# Patient Record
Sex: FEMALE | Race: WHITE | NOT HISPANIC OR LATINO
[De-identification: names, ages, dates, MRNs, and addresses within clinical notes are randomized per-mention and may not be internally consistent; named-entity substitution may affect disease eponyms.]

---

## 2019-01-22 PROBLEM — Z00.00 ENCOUNTER FOR PREVENTIVE HEALTH EXAMINATION: Status: ACTIVE | Noted: 2019-01-22

## 2019-02-21 ENCOUNTER — APPOINTMENT (OUTPATIENT)
Dept: ANTEPARTUM | Facility: CLINIC | Age: 24
End: 2019-02-21
Payer: MEDICAID

## 2019-02-21 ENCOUNTER — ASOB RESULT (OUTPATIENT)
Age: 24
End: 2019-02-21

## 2019-02-21 PROCEDURE — 76817 TRANSVAGINAL US OBSTETRIC: CPT | Mod: 59

## 2019-02-21 PROCEDURE — 76811 OB US DETAILED SNGL FETUS: CPT

## 2019-06-28 ENCOUNTER — INPATIENT (INPATIENT)
Facility: HOSPITAL | Age: 24
LOS: 1 days | Discharge: HOME | End: 2019-06-30
Attending: SPECIALIST | Admitting: SPECIALIST

## 2019-06-28 ENCOUNTER — TRANSCRIPTION ENCOUNTER (OUTPATIENT)
Age: 24
End: 2019-06-28

## 2019-06-28 VITALS
HEART RATE: 81 BPM | DIASTOLIC BLOOD PRESSURE: 71 MMHG | RESPIRATION RATE: 14 BRPM | OXYGEN SATURATION: 100 % | TEMPERATURE: 99 F | SYSTOLIC BLOOD PRESSURE: 116 MMHG

## 2019-06-28 DIAGNOSIS — O26.899 OTHER SPECIFIED PREGNANCY RELATED CONDITIONS, UNSPECIFIED TRIMESTER: ICD-10-CM

## 2019-06-28 DIAGNOSIS — O42.90 PREMATURE RUPTURE OF MEMBRANES, UNSPECIFIED AS TO LENGTH OF TIME BETWEEN RUPTURE AND ONSET OF LABOR, UNSPECIFIED WEEKS OF GESTATION: ICD-10-CM

## 2019-06-28 DIAGNOSIS — Z3A.00 WEEKS OF GESTATION OF PREGNANCY NOT SPECIFIED: ICD-10-CM

## 2019-06-28 LAB
BASOPHILS # BLD AUTO: 0.01 K/UL — SIGNIFICANT CHANGE UP (ref 0–0.2)
BASOPHILS NFR BLD AUTO: 0.1 % — SIGNIFICANT CHANGE UP (ref 0–2)
BLD GP AB SCN SERPL QL: NEGATIVE — SIGNIFICANT CHANGE UP
EOSINOPHIL # BLD AUTO: 0.02 K/UL — SIGNIFICANT CHANGE UP (ref 0–0.5)
EOSINOPHIL NFR BLD AUTO: 0.2 % — SIGNIFICANT CHANGE UP (ref 0–6)
HBV SURFACE AG SER-ACNC: NEGATIVE — SIGNIFICANT CHANGE UP
HCT VFR BLD CALC: 38.1 % — SIGNIFICANT CHANGE UP (ref 34.5–45)
HGB BLD-MCNC: 12.6 G/DL — SIGNIFICANT CHANGE UP (ref 11.5–15.5)
HIV COMBO RESULT: SIGNIFICANT CHANGE UP
HIV1+2 AB SPEC QL: SIGNIFICANT CHANGE UP
IMM GRANULOCYTES NFR BLD AUTO: 1.1 % — SIGNIFICANT CHANGE UP (ref 0–1.5)
LYMPHOCYTES # BLD AUTO: 1.87 K/UL — SIGNIFICANT CHANGE UP (ref 1–3.3)
LYMPHOCYTES # BLD AUTO: 20 % — SIGNIFICANT CHANGE UP (ref 13–44)
MCHC RBC-ENTMCNC: 27.7 PG — SIGNIFICANT CHANGE UP (ref 27–34)
MCHC RBC-ENTMCNC: 33.1 % — SIGNIFICANT CHANGE UP (ref 32–36)
MCV RBC AUTO: 83.7 FL — SIGNIFICANT CHANGE UP (ref 80–100)
MONOCYTES # BLD AUTO: 0.75 K/UL — SIGNIFICANT CHANGE UP (ref 0–0.9)
MONOCYTES NFR BLD AUTO: 8 % — SIGNIFICANT CHANGE UP (ref 2–14)
NEUTROPHILS # BLD AUTO: 6.59 K/UL — SIGNIFICANT CHANGE UP (ref 1.8–7.4)
NEUTROPHILS NFR BLD AUTO: 70.6 % — SIGNIFICANT CHANGE UP (ref 43–77)
NRBC # FLD: 0 K/UL — SIGNIFICANT CHANGE UP (ref 0–0)
PLATELET # BLD AUTO: 208 K/UL — SIGNIFICANT CHANGE UP (ref 150–400)
PMV BLD: 12.2 FL — SIGNIFICANT CHANGE UP (ref 7–13)
RBC # BLD: 4.55 M/UL — SIGNIFICANT CHANGE UP (ref 3.8–5.2)
RBC # FLD: 13.7 % — SIGNIFICANT CHANGE UP (ref 10.3–14.5)
RH IG SCN BLD-IMP: POSITIVE — SIGNIFICANT CHANGE UP
RH IG SCN BLD-IMP: POSITIVE — SIGNIFICANT CHANGE UP
T PALLIDUM AB TITR SER: NEGATIVE — SIGNIFICANT CHANGE UP
WBC # BLD: 9.34 K/UL — SIGNIFICANT CHANGE UP (ref 3.8–10.5)
WBC # FLD AUTO: 9.34 K/UL — SIGNIFICANT CHANGE UP (ref 3.8–10.5)

## 2019-06-28 RX ORDER — CITRIC ACID/SODIUM CITRATE 300-500 MG
15 SOLUTION, ORAL ORAL EVERY 6 HOURS
Refills: 0 | Status: DISCONTINUED | OUTPATIENT
Start: 2019-06-28 | End: 2019-06-28

## 2019-06-28 RX ORDER — GLYCERIN ADULT
1 SUPPOSITORY, RECTAL RECTAL AT BEDTIME
Refills: 0 | Status: DISCONTINUED | OUTPATIENT
Start: 2019-06-28 | End: 2019-06-30

## 2019-06-28 RX ORDER — SIMETHICONE 80 MG/1
80 TABLET, CHEWABLE ORAL EVERY 4 HOURS
Refills: 0 | Status: DISCONTINUED | OUTPATIENT
Start: 2019-06-28 | End: 2019-06-30

## 2019-06-28 RX ORDER — ACETAMINOPHEN 500 MG
975 TABLET ORAL
Refills: 0 | Status: DISCONTINUED | OUTPATIENT
Start: 2019-06-28 | End: 2019-06-30

## 2019-06-28 RX ORDER — IBUPROFEN 200 MG
600 TABLET ORAL EVERY 6 HOURS
Refills: 0 | Status: DISCONTINUED | OUTPATIENT
Start: 2019-06-28 | End: 2019-06-30

## 2019-06-28 RX ORDER — DOCUSATE SODIUM 100 MG
100 CAPSULE ORAL
Refills: 0 | Status: DISCONTINUED | OUTPATIENT
Start: 2019-06-28 | End: 2019-06-30

## 2019-06-28 RX ORDER — HYDROCORTISONE 1 %
1 OINTMENT (GRAM) TOPICAL EVERY 6 HOURS
Refills: 0 | Status: DISCONTINUED | OUTPATIENT
Start: 2019-06-28 | End: 2019-06-30

## 2019-06-28 RX ORDER — PRAMOXINE HYDROCHLORIDE 150 MG/15G
1 AEROSOL, FOAM RECTAL EVERY 4 HOURS
Refills: 0 | Status: DISCONTINUED | OUTPATIENT
Start: 2019-06-28 | End: 2019-06-30

## 2019-06-28 RX ORDER — OXYCODONE HYDROCHLORIDE 5 MG/1
5 TABLET ORAL ONCE
Refills: 0 | Status: DISCONTINUED | OUTPATIENT
Start: 2019-06-28 | End: 2019-06-30

## 2019-06-28 RX ORDER — OXYTOCIN 10 UNIT/ML
333.33 VIAL (ML) INJECTION
Qty: 20 | Refills: 0 | Status: DISCONTINUED | OUTPATIENT
Start: 2019-06-28 | End: 2019-06-28

## 2019-06-28 RX ORDER — FOLIC ACID 0.8 MG
0 TABLET ORAL
Qty: 0 | Refills: 0 | DISCHARGE

## 2019-06-28 RX ORDER — SODIUM CHLORIDE 9 MG/ML
3 INJECTION INTRAMUSCULAR; INTRAVENOUS; SUBCUTANEOUS EVERY 8 HOURS
Refills: 0 | Status: DISCONTINUED | OUTPATIENT
Start: 2019-06-28 | End: 2019-06-30

## 2019-06-28 RX ORDER — AMPICILLIN TRIHYDRATE 250 MG
1 CAPSULE ORAL EVERY 4 HOURS
Refills: 0 | Status: DISCONTINUED | OUTPATIENT
Start: 2019-06-28 | End: 2019-06-28

## 2019-06-28 RX ORDER — DIBUCAINE 1 %
1 OINTMENT (GRAM) RECTAL EVERY 6 HOURS
Refills: 0 | Status: DISCONTINUED | OUTPATIENT
Start: 2019-06-28 | End: 2019-06-30

## 2019-06-28 RX ORDER — SODIUM CHLORIDE 9 MG/ML
1000 INJECTION, SOLUTION INTRAVENOUS
Refills: 0 | Status: DISCONTINUED | OUTPATIENT
Start: 2019-06-28 | End: 2019-06-28

## 2019-06-28 RX ORDER — KETOROLAC TROMETHAMINE 30 MG/ML
30 SYRINGE (ML) INJECTION ONCE
Refills: 0 | Status: DISCONTINUED | OUTPATIENT
Start: 2019-06-28 | End: 2019-06-28

## 2019-06-28 RX ORDER — AER TRAVELER 0.5 G/1
1 SOLUTION RECTAL; TOPICAL EVERY 4 HOURS
Refills: 0 | Status: DISCONTINUED | OUTPATIENT
Start: 2019-06-28 | End: 2019-06-30

## 2019-06-28 RX ORDER — DIPHENHYDRAMINE HCL 50 MG
25 CAPSULE ORAL EVERY 6 HOURS
Refills: 0 | Status: DISCONTINUED | OUTPATIENT
Start: 2019-06-28 | End: 2019-06-30

## 2019-06-28 RX ORDER — OXYCODONE HYDROCHLORIDE 5 MG/1
5 TABLET ORAL
Refills: 0 | Status: DISCONTINUED | OUTPATIENT
Start: 2019-06-28 | End: 2019-06-30

## 2019-06-28 RX ORDER — LANOLIN
1 OINTMENT (GRAM) TOPICAL EVERY 6 HOURS
Refills: 0 | Status: DISCONTINUED | OUTPATIENT
Start: 2019-06-28 | End: 2019-06-30

## 2019-06-28 RX ORDER — AMPICILLIN TRIHYDRATE 250 MG
2 CAPSULE ORAL ONCE
Refills: 0 | Status: COMPLETED | OUTPATIENT
Start: 2019-06-28 | End: 2019-06-28

## 2019-06-28 RX ORDER — MAGNESIUM HYDROXIDE 400 MG/1
30 TABLET, CHEWABLE ORAL
Refills: 0 | Status: DISCONTINUED | OUTPATIENT
Start: 2019-06-28 | End: 2019-06-30

## 2019-06-28 RX ORDER — IBUPROFEN 200 MG
600 TABLET ORAL EVERY 6 HOURS
Refills: 0 | Status: COMPLETED | OUTPATIENT
Start: 2019-06-28 | End: 2020-05-26

## 2019-06-28 RX ORDER — TETANUS TOXOID, REDUCED DIPHTHERIA TOXOID AND ACELLULAR PERTUSSIS VACCINE, ADSORBED 5; 2.5; 8; 8; 2.5 [IU]/.5ML; [IU]/.5ML; UG/.5ML; UG/.5ML; UG/.5ML
0.5 SUSPENSION INTRAMUSCULAR ONCE
Refills: 0 | Status: DISCONTINUED | OUTPATIENT
Start: 2019-06-28 | End: 2019-06-30

## 2019-06-28 RX ORDER — BENZOCAINE 10 %
1 GEL (GRAM) MUCOUS MEMBRANE EVERY 6 HOURS
Refills: 0 | Status: DISCONTINUED | OUTPATIENT
Start: 2019-06-28 | End: 2019-06-30

## 2019-06-28 RX ADMIN — SODIUM CHLORIDE 125 MILLILITER(S): 9 INJECTION, SOLUTION INTRAVENOUS at 04:11

## 2019-06-28 RX ADMIN — Medication 216 GRAM(S): at 04:11

## 2019-06-28 RX ADMIN — SODIUM CHLORIDE 125 MILLILITER(S): 9 INJECTION, SOLUTION INTRAVENOUS at 07:59

## 2019-06-28 RX ADMIN — Medication 600 MILLIGRAM(S): at 22:58

## 2019-06-28 RX ADMIN — Medication 108 GRAM(S): at 07:58

## 2019-06-28 RX ADMIN — Medication 1000 MILLIUNIT(S)/MIN: at 12:26

## 2019-06-28 RX ADMIN — Medication 600 MILLIGRAM(S): at 23:30

## 2019-06-28 RX ADMIN — Medication 30 MILLIGRAM(S): at 12:00

## 2019-06-28 RX ADMIN — Medication 30 MILLIGRAM(S): at 12:26

## 2019-06-28 NOTE — OB RN PATIENT PROFILE - NS PRO TDAP RECEIVED Y/N
January 25, 2018     Patient: Luna Madrigal   YOB: 1985   Date of Visit: 1/25/2018       To Whom it May Concern:    Margothnicolas Juarez is under my professional care  She was seen in my office on 1/25/2018  She may return to work on 1/29/18  If you have any questions or concerns, please don't hesitate to call           Sincerely,          Wade Cao DO        CC: Luna Rohan
no...

## 2019-06-28 NOTE — OB PROVIDER TRIAGE NOTE - HISTORY OF PRESENT ILLNESS
25 y/o  at 39w1d presents to triage c/o LOF since 1:45am today  Also c/o strong uterine contractions.  Reports +FM, no vaginal bleeding, no ROM or LOF  AP complications: Denies

## 2019-06-28 NOTE — OB RN DELIVERY SUMMARY - NS_MODEOFTRANSA_OBGYN_ALL_OB
[facility]  [place], [address] [place] [address] Rd  Phone [phone]   Fax [phone]      Transported with Mother

## 2019-06-28 NOTE — PROGRESS NOTE ADULT - SUBJECTIVE AND OBJECTIVE BOX
S: Patient doing well. Minimal lochia. Pain controlled. breastfeeding    O: Vital Signs Last 24 Hrs  T(C): 36.9 (2019 07:17), Max: 37.2 (2019 02:57)  T(F): 98.42 (2019 07:17), Max: 99 (2019 02:57)  HR: 111 (2019 07:12) (81 - 117)  BP: 112/- (2019 07:12) (95/56 - 133/80)  BP(mean): --  RR: 18 (2019 04:26) (14 - 18)  SpO2: 99% (2019 07:10) (88% - 100%)    Gen: NAD  Abd: soft, NT, ND, fundus firm below umbilicus  Lochia: moderate  Ext: no tenderness    Labs:                        12.6   9.34  )-----------( 208      ( 2019 03:50 )             38.1       ABO Interpretation: A ( @ 03:58)    Rh Interpretation: Positive ( @ 03:58)    Antibody Screen Negative      A: 24y PPD# s/p  doing well.     Plan: Continue routine postpartum care.

## 2019-06-28 NOTE — OB PROVIDER H&P - NSHPPHYSICALEXAM_GEN_ALL_CORE
T(C): 36.6 (28 Jun 2019 03:38), Max: 37.2 (28 Jun 2019 02:57)  T(F): 97.88 (28 Jun 2019 03:38), Max: 99 (28 Jun 2019 02:57)  HR: 101 (28 Jun 2019 03:37) (81 - 101)  BP: 120/75 (28 Jun 2019 03:37) (116/71 - 120/75)  RR: 14 (28 Jun 2019 02:57) (14 - 14)  SpO2: 100% (28 Jun 2019 02:57) (100% - 100%)    Abdomen: Gravid, soft, NT  NST: mod variability  [place]: Ctx q2-4mins  VE: 4/70/-2, Gross ROM, clear fluid

## 2019-06-28 NOTE — OB RN DELIVERY SUMMARY - NS_SEPSISRSKCALC_OBGYN_ALL_OB_FT
EOS calculated successfully. EOS Risk Factor: 0.5/1000 live births ([facility] national incidence); GA=39w1d; Temp=99; ROM=9; GBS='Positive'; Antibiotics='Broad spectrum antibiotics 2-3.9 hrs prior to birth'

## 2019-06-28 NOTE — DISCHARGE NOTE OB - CARE PROVIDER_API CALL
Paul Palencia)  Obstetrics and Gynecology  [address] [facility], [address] [place] [address]  Phone: [phone]  Fax: [phone]  Follow Up Time:

## 2019-06-28 NOTE — OB PROVIDER H&P - NSHPREVIEWOFSYSTEMS_GEN_ALL_CORE
CONSTITUTIONAL: No weakness, fevers or chills  EYES/ENT: No visual changes;  No vertigo or throat pain   NECK: No pain or stiffness  RESPIRATORY: No cough, wheezing, hemoptysis; No shortness of breath  CARDIOVASCULAR: No chest pain or palpitations  GASTROINTESTINAL: No abdominal or epigastric pain. No nausea, vomiting, or hematemesis; No diarrhea or constipation. No melena or hematochezia.  GENITOURINARY: No dysuria, frequency or hematuria  NEUROLOGICAL: No numbness or weakness  SKIN: No itching, burning, rashes, or lesions

## 2019-06-28 NOTE — OB PROVIDER H&P - ASSESSMENT
25 y/o  at 39w1d presents to triage c/o LOF since 1:45am today  Also c/o strong uterine contractions.  Reports +FM, no vaginal bleeding, no ROM or LOF  AP complications: Denies    OBH:   PMH: denies  PSH: Denies  NKDA  meds: PNV    VS:   T(C): 36.6 (2019 03:38), Max: 37.2 (2019 02:57)  T(F): 97.88 (2019 03:38), Max: 99 (2019 02:57)  HR: 101 (2019 03:37) (81 - 101)  BP: 120/75 (2019 03:37) (116/71 - 120/75)  RR: 14 (2019 02:57) (14 - 14)  SpO2: 100% (2019 02:57) (100% - 100%)    Abdomen: Gravid, soft, NT  NST: mod variability  [place]: Ctx q2-4mins  VE: 4/70/-2, Gross ROM, clear fluid    A/P: Pt at 39w1d with SROM in labor  D/w Dr Palencia  Admit to L&D  Routine labs  IV hydration  Ampicillin

## 2019-06-28 NOTE — OB PROVIDER DELIVERY SUMMARY - NSPROVIDERDELIVERYNOTE_OBGYN_ALL_OB_FT
Spontaneous vaginal delivery of liveborn infant from LILIANE position. Head, shoulders, and body delivered easily. Infant was suctioned. No mec. Cord was clamped and cut and infant was passed to mother/peds. Placenta delivered intact with a 3 vessel cord. Fundal massage was given and uterine fundus was found to be firm. Vaginal exam revealed an intact cervix, vaginal walls and sulci. Patient had a 1st degree laceration in the perineum that was repaired with 2.0 chromic suture. Excellent hemostasis was noted. Patient was stable and went to recovery. Count was correct x 2.

## 2019-06-28 NOTE — DISCHARGE NOTE OB - PATIENT PORTAL LINK FT
You can access the [facility] Patient Portal, offered by [facility], by registering with the following website: [email or web address]

## 2019-06-29 RX ADMIN — Medication 975 MILLIGRAM(S): at 03:59

## 2019-06-29 RX ADMIN — Medication 600 MILLIGRAM(S): at 22:24

## 2019-06-29 RX ADMIN — Medication 600 MILLIGRAM(S): at 11:00

## 2019-06-29 RX ADMIN — Medication 600 MILLIGRAM(S): at 10:18

## 2019-06-29 RX ADMIN — SODIUM CHLORIDE 3 MILLILITER(S): 9 INJECTION INTRAMUSCULAR; INTRAVENOUS; SUBCUTANEOUS at 22:30

## 2019-06-29 RX ADMIN — Medication 975 MILLIGRAM(S): at 04:30

## 2019-06-29 RX ADMIN — SODIUM CHLORIDE 3 MILLILITER(S): 9 INJECTION INTRAMUSCULAR; INTRAVENOUS; SUBCUTANEOUS at 16:25

## 2019-06-29 RX ADMIN — Medication 600 MILLIGRAM(S): at 22:54

## 2019-06-29 NOTE — LACTATION INITIAL EVALUATION - LACTATION INTERVENTIONS
Instructed and assisted with positioning to facilitate deep latch.  Encouraged to feed on cue and follow the feeding log, reviewed. Taught hand expression.  Discussed outpatient resources available, warm line, breastfeeding support group.  Primary RN made aware of consult./initiate skin to skin/initiate hand expression routine

## 2019-06-30 VITALS
SYSTOLIC BLOOD PRESSURE: 111 MMHG | HEART RATE: 73 BPM | DIASTOLIC BLOOD PRESSURE: 73 MMHG | RESPIRATION RATE: 16 BRPM | OXYGEN SATURATION: 99 % | TEMPERATURE: 98 F

## 2019-06-30 RX ADMIN — Medication 600 MILLIGRAM(S): at 07:06

## 2019-07-02 LAB
RUBV IGG SER-ACNC: 1.3 INDEX — SIGNIFICANT CHANGE UP
RUBV IGG SER-IMP: POSITIVE — SIGNIFICANT CHANGE UP

## 2020-08-04 NOTE — OB RN TRIAGE NOTE - TOBACCO USE
Patient seen and examined with surgery PA in ED awaiting admission and discussed management plans with patient. Lab and CT reviewed Findings consistent with acute appendicitis. Options explained to patient and informed consent signed by patient for laparoscopic appendectomy. patient added on to OR today. Iv antibiotics
Never smoker

## 2022-05-01 NOTE — DISCHARGE NOTE OB - BREAST MILK SUPPORTS STABLE NEWBORN BLOOD SUGAR
Bladder scanned patient for 180 ml provider notified.   
Pt and pt's family member given written and verbal discharge instructions. All questions answered at the time of discharge.  
Statement Selected

## 2023-06-01 PROBLEM — E28.2 POLYCYSTIC OVARIAN SYNDROME: Chronic | Status: ACTIVE | Noted: 2019-06-28

## 2023-06-06 ENCOUNTER — APPOINTMENT (OUTPATIENT)
Dept: OBGYN | Facility: CLINIC | Age: 28
End: 2023-06-06
Payer: COMMERCIAL

## 2023-06-06 ENCOUNTER — LABORATORY RESULT (OUTPATIENT)
Age: 28
End: 2023-06-06

## 2023-06-06 VITALS
DIASTOLIC BLOOD PRESSURE: 78 MMHG | HEART RATE: 96 BPM | OXYGEN SATURATION: 98 % | SYSTOLIC BLOOD PRESSURE: 121 MMHG | TEMPERATURE: 97.5 F

## 2023-06-06 VITALS
OXYGEN SATURATION: 97 % | DIASTOLIC BLOOD PRESSURE: 78 MMHG | SYSTOLIC BLOOD PRESSURE: 126 MMHG | HEART RATE: 97 BPM | TEMPERATURE: 98.2 F

## 2023-06-06 DIAGNOSIS — O02.1 MISSED ABORTION: ICD-10-CM

## 2023-06-06 PROCEDURE — 99204 OFFICE O/P NEW MOD 45 MIN: CPT | Mod: 25

## 2023-06-06 PROCEDURE — 59820 CARE OF MISCARRIAGE: CPT

## 2023-06-06 RX ORDER — IBUPROFEN 600 MG/1
600 TABLET, FILM COATED ORAL 4 TIMES DAILY
Qty: 60 | Refills: 0 | Status: ACTIVE | COMMUNITY
Start: 2023-06-06 | End: 1900-01-01

## 2023-06-06 RX ORDER — DOXYCYCLINE HYCLATE 100 MG/1
100 TABLET ORAL
Qty: 2 | Refills: 0 | Status: ACTIVE | COMMUNITY
Start: 2023-06-06 | End: 1900-01-01
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
## 2023-06-06 NOTE — PHYSICAL EXAM
did not meet with pt but met with the President of the [employer].  Offered support to this sister.  She told me other sisters would be coming later to sit with Sr. Anna.   [Chaperone Present] : A chaperone was present in the examining room during all aspects of the physical examination [FreeTextEntry1] : Kari Knight MA [Appropriately responsive] : appropriately responsive [Alert] : alert [No Acute Distress] : no acute distress [Soft] : soft [Non-tender] : non-tender [Non-distended] : non-distended [Oriented x3] : oriented x3
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
## 2023-06-26 ENCOUNTER — APPOINTMENT (OUTPATIENT)
Dept: OBGYN | Facility: CLINIC | Age: 28
End: 2023-06-26

## 2023-07-07 ENCOUNTER — TRANSCRIPTION ENCOUNTER (OUTPATIENT)
Age: 28
End: 2023-07-07

## 2023-07-07 ENCOUNTER — APPOINTMENT (OUTPATIENT)
Dept: HUMAN REPRODUCTION | Facility: CLINIC | Age: 28
End: 2023-07-07
Payer: COMMERCIAL

## 2023-07-07 PROCEDURE — 99215 OFFICE O/P EST HI 40 MIN: CPT | Mod: 24

## 2023-07-07 PROCEDURE — 76830 TRANSVAGINAL US NON-OB: CPT

## 2023-07-07 PROCEDURE — 99205 OFFICE O/P NEW HI 60 MIN: CPT | Mod: 25

## 2023-07-07 PROCEDURE — 36415 COLL VENOUS BLD VENIPUNCTURE: CPT

## 2023-07-14 ENCOUNTER — NON-APPOINTMENT (OUTPATIENT)
Age: 28
End: 2023-07-14

## 2023-07-18 ENCOUNTER — APPOINTMENT (OUTPATIENT)
Dept: HUMAN REPRODUCTION | Facility: CLINIC | Age: 28
End: 2023-07-18

## 2023-07-28 ENCOUNTER — APPOINTMENT (OUTPATIENT)
Dept: HUMAN REPRODUCTION | Facility: CLINIC | Age: 28
End: 2023-07-28
Payer: COMMERCIAL

## 2023-07-28 PROCEDURE — 58340 CATHETER FOR HYSTEROGRAPHY: CPT | Mod: 58

## 2023-07-28 PROCEDURE — 74740 X-RAY FEMALE GENITAL TRACT: CPT

## 2023-07-28 PROCEDURE — 58999I: CUSTOM

## 2023-07-28 PROCEDURE — 99214 OFFICE O/P EST MOD 30 MIN: CPT | Mod: 25

## 2023-07-28 PROCEDURE — 76831 ECHO EXAM UTERUS: CPT

## 2023-08-14 ENCOUNTER — APPOINTMENT (OUTPATIENT)
Dept: HUMAN REPRODUCTION | Facility: CLINIC | Age: 28
End: 2023-08-14
Payer: COMMERCIAL

## 2023-08-14 PROCEDURE — 99215 OFFICE O/P EST HI 40 MIN: CPT | Mod: 95,24

## 2023-08-21 ENCOUNTER — APPOINTMENT (OUTPATIENT)
Dept: HUMAN REPRODUCTION | Facility: CLINIC | Age: 28
End: 2023-08-21
Payer: COMMERCIAL

## 2023-08-21 PROCEDURE — 76857 US EXAM PELVIC LIMITED: CPT

## 2023-08-21 PROCEDURE — 36415 COLL VENOUS BLD VENIPUNCTURE: CPT

## 2023-08-21 PROCEDURE — 99213 OFFICE O/P EST LOW 20 MIN: CPT | Mod: 25

## 2023-08-24 ENCOUNTER — APPOINTMENT (OUTPATIENT)
Dept: HUMAN REPRODUCTION | Facility: CLINIC | Age: 28
End: 2023-08-24
Payer: COMMERCIAL

## 2023-08-24 PROCEDURE — 99213 OFFICE O/P EST LOW 20 MIN: CPT | Mod: 25

## 2023-08-24 PROCEDURE — 36415 COLL VENOUS BLD VENIPUNCTURE: CPT

## 2023-08-24 PROCEDURE — 76857 US EXAM PELVIC LIMITED: CPT

## 2023-09-13 ENCOUNTER — APPOINTMENT (OUTPATIENT)
Dept: HUMAN REPRODUCTION | Facility: CLINIC | Age: 28
End: 2023-09-13
Payer: COMMERCIAL

## 2023-09-13 PROCEDURE — S4042: CPT

## 2023-09-13 PROCEDURE — 36415 COLL VENOUS BLD VENIPUNCTURE: CPT

## 2023-09-13 PROCEDURE — 76830 TRANSVAGINAL US NON-OB: CPT

## 2023-09-21 ENCOUNTER — APPOINTMENT (OUTPATIENT)
Dept: HUMAN REPRODUCTION | Facility: CLINIC | Age: 28
End: 2023-09-21
Payer: COMMERCIAL

## 2023-09-21 PROCEDURE — 99213 OFFICE O/P EST LOW 20 MIN: CPT | Mod: 25

## 2023-09-21 PROCEDURE — 36415 COLL VENOUS BLD VENIPUNCTURE: CPT

## 2023-09-21 PROCEDURE — 76857 US EXAM PELVIC LIMITED: CPT

## 2023-09-26 ENCOUNTER — APPOINTMENT (OUTPATIENT)
Dept: HUMAN REPRODUCTION | Facility: CLINIC | Age: 28
End: 2023-09-26
Payer: COMMERCIAL

## 2023-09-26 PROCEDURE — 99213 OFFICE O/P EST LOW 20 MIN: CPT | Mod: 25

## 2023-09-26 PROCEDURE — 96372 THER/PROPH/DIAG INJ SC/IM: CPT

## 2023-09-26 PROCEDURE — 76857 US EXAM PELVIC LIMITED: CPT

## 2023-09-26 PROCEDURE — 36415 COLL VENOUS BLD VENIPUNCTURE: CPT

## 2023-10-17 ENCOUNTER — TRANSCRIPTION ENCOUNTER (OUTPATIENT)
Age: 28
End: 2023-10-17

## 2023-10-17 ENCOUNTER — INPATIENT (INPATIENT)
Facility: HOSPITAL | Age: 28
LOS: 0 days | Discharge: HOME | DRG: 819 | End: 2023-10-18
Attending: OBSTETRICS & GYNECOLOGY | Admitting: OBSTETRICS & GYNECOLOGY
Payer: COMMERCIAL

## 2023-10-17 VITALS
OXYGEN SATURATION: 98 % | HEART RATE: 81 BPM | SYSTOLIC BLOOD PRESSURE: 112 MMHG | DIASTOLIC BLOOD PRESSURE: 76 MMHG | RESPIRATION RATE: 20 BRPM

## 2023-10-17 PROCEDURE — 99285 EMERGENCY DEPT VISIT HI MDM: CPT

## 2023-10-18 ENCOUNTER — RESULT REVIEW (OUTPATIENT)
Age: 28
End: 2023-10-18

## 2023-10-18 ENCOUNTER — TRANSCRIPTION ENCOUNTER (OUTPATIENT)
Age: 28
End: 2023-10-18

## 2023-10-18 VITALS
OXYGEN SATURATION: 100 % | HEART RATE: 104 BPM | RESPIRATION RATE: 15 BRPM | SYSTOLIC BLOOD PRESSURE: 105 MMHG | DIASTOLIC BLOOD PRESSURE: 72 MMHG | TEMPERATURE: 98 F

## 2023-10-18 DIAGNOSIS — O00.90 UNSPECIFIED ECTOPIC PREGNANCY WITHOUT INTRAUTERINE PREGNANCY: ICD-10-CM

## 2023-10-18 LAB
ALBUMIN SERPL ELPH-MCNC: 4.5 G/DL — SIGNIFICANT CHANGE UP (ref 3.3–5)
ALBUMIN SERPL ELPH-MCNC: 4.5 G/DL — SIGNIFICANT CHANGE UP (ref 3.3–5)
ALP SERPL-CCNC: 64 U/L — SIGNIFICANT CHANGE UP (ref 40–120)
ALP SERPL-CCNC: 64 U/L — SIGNIFICANT CHANGE UP (ref 40–120)
ALT FLD-CCNC: 10 U/L — SIGNIFICANT CHANGE UP (ref 10–45)
ALT FLD-CCNC: 10 U/L — SIGNIFICANT CHANGE UP (ref 10–45)
ANION GAP SERPL CALC-SCNC: 14 MMOL/L — SIGNIFICANT CHANGE UP (ref 5–17)
ANION GAP SERPL CALC-SCNC: 14 MMOL/L — SIGNIFICANT CHANGE UP (ref 5–17)
APPEARANCE UR: CLEAR — SIGNIFICANT CHANGE UP
APPEARANCE UR: CLEAR — SIGNIFICANT CHANGE UP
APTT BLD: 34.3 SEC — SIGNIFICANT CHANGE UP (ref 24.5–35.6)
APTT BLD: 34.3 SEC — SIGNIFICANT CHANGE UP (ref 24.5–35.6)
AST SERPL-CCNC: 11 U/L — SIGNIFICANT CHANGE UP (ref 10–40)
AST SERPL-CCNC: 11 U/L — SIGNIFICANT CHANGE UP (ref 10–40)
BASOPHILS # BLD AUTO: 0.02 K/UL — SIGNIFICANT CHANGE UP (ref 0–0.2)
BASOPHILS # BLD AUTO: 0.02 K/UL — SIGNIFICANT CHANGE UP (ref 0–0.2)
BASOPHILS NFR BLD AUTO: 0.1 % — SIGNIFICANT CHANGE UP (ref 0–2)
BASOPHILS NFR BLD AUTO: 0.1 % — SIGNIFICANT CHANGE UP (ref 0–2)
BILIRUB SERPL-MCNC: 0.4 MG/DL — SIGNIFICANT CHANGE UP (ref 0.2–1.2)
BILIRUB SERPL-MCNC: 0.4 MG/DL — SIGNIFICANT CHANGE UP (ref 0.2–1.2)
BILIRUB UR-MCNC: NEGATIVE — SIGNIFICANT CHANGE UP
BILIRUB UR-MCNC: NEGATIVE — SIGNIFICANT CHANGE UP
BLD GP AB SCN SERPL QL: NEGATIVE — SIGNIFICANT CHANGE UP
BLD GP AB SCN SERPL QL: NEGATIVE — SIGNIFICANT CHANGE UP
BUN SERPL-MCNC: 15 MG/DL — SIGNIFICANT CHANGE UP (ref 7–23)
BUN SERPL-MCNC: 15 MG/DL — SIGNIFICANT CHANGE UP (ref 7–23)
CALCIUM SERPL-MCNC: 9.4 MG/DL — SIGNIFICANT CHANGE UP (ref 8.4–10.5)
CALCIUM SERPL-MCNC: 9.4 MG/DL — SIGNIFICANT CHANGE UP (ref 8.4–10.5)
CHLORIDE SERPL-SCNC: 101 MMOL/L — SIGNIFICANT CHANGE UP (ref 96–108)
CHLORIDE SERPL-SCNC: 101 MMOL/L — SIGNIFICANT CHANGE UP (ref 96–108)
CO2 SERPL-SCNC: 22 MMOL/L — SIGNIFICANT CHANGE UP (ref 22–31)
CO2 SERPL-SCNC: 22 MMOL/L — SIGNIFICANT CHANGE UP (ref 22–31)
COLOR SPEC: SIGNIFICANT CHANGE UP
COLOR SPEC: SIGNIFICANT CHANGE UP
CREAT SERPL-MCNC: 0.61 MG/DL — SIGNIFICANT CHANGE UP (ref 0.5–1.3)
CREAT SERPL-MCNC: 0.61 MG/DL — SIGNIFICANT CHANGE UP (ref 0.5–1.3)
DIFF PNL FLD: NEGATIVE — SIGNIFICANT CHANGE UP
DIFF PNL FLD: NEGATIVE — SIGNIFICANT CHANGE UP
EGFR: 125 ML/MIN/1.73M2 — SIGNIFICANT CHANGE UP
EGFR: 125 ML/MIN/1.73M2 — SIGNIFICANT CHANGE UP
EOSINOPHIL # BLD AUTO: 0.03 K/UL — SIGNIFICANT CHANGE UP (ref 0–0.5)
EOSINOPHIL # BLD AUTO: 0.03 K/UL — SIGNIFICANT CHANGE UP (ref 0–0.5)
EOSINOPHIL NFR BLD AUTO: 0.2 % — SIGNIFICANT CHANGE UP (ref 0–6)
EOSINOPHIL NFR BLD AUTO: 0.2 % — SIGNIFICANT CHANGE UP (ref 0–6)
GLUCOSE SERPL-MCNC: 105 MG/DL — HIGH (ref 70–99)
GLUCOSE SERPL-MCNC: 105 MG/DL — HIGH (ref 70–99)
GLUCOSE UR QL: NEGATIVE — SIGNIFICANT CHANGE UP
GLUCOSE UR QL: NEGATIVE — SIGNIFICANT CHANGE UP
HCG SERPL-ACNC: 27.7 MIU/ML — HIGH
HCG SERPL-ACNC: 27.7 MIU/ML — HIGH
HCG SERPL-ACNC: 39.3 MIU/ML — HIGH
HCG SERPL-ACNC: 39.3 MIU/ML — HIGH
HCT VFR BLD CALC: 38.3 % — SIGNIFICANT CHANGE UP (ref 34.5–45)
HCT VFR BLD CALC: 38.3 % — SIGNIFICANT CHANGE UP (ref 34.5–45)
HCT VFR BLD CALC: 40.6 % — SIGNIFICANT CHANGE UP (ref 34.5–45)
HCT VFR BLD CALC: 40.6 % — SIGNIFICANT CHANGE UP (ref 34.5–45)
HGB BLD-MCNC: 13.1 G/DL — SIGNIFICANT CHANGE UP (ref 11.5–15.5)
HGB BLD-MCNC: 13.1 G/DL — SIGNIFICANT CHANGE UP (ref 11.5–15.5)
HGB BLD-MCNC: 13.5 G/DL — SIGNIFICANT CHANGE UP (ref 11.5–15.5)
HGB BLD-MCNC: 13.5 G/DL — SIGNIFICANT CHANGE UP (ref 11.5–15.5)
IMM GRANULOCYTES NFR BLD AUTO: 0.3 % — SIGNIFICANT CHANGE UP (ref 0–0.9)
IMM GRANULOCYTES NFR BLD AUTO: 0.3 % — SIGNIFICANT CHANGE UP (ref 0–0.9)
INR BLD: 1.15 RATIO — SIGNIFICANT CHANGE UP (ref 0.85–1.18)
INR BLD: 1.15 RATIO — SIGNIFICANT CHANGE UP (ref 0.85–1.18)
KETONES UR-MCNC: SIGNIFICANT CHANGE UP
KETONES UR-MCNC: SIGNIFICANT CHANGE UP
LEUKOCYTE ESTERASE UR-ACNC: NEGATIVE — SIGNIFICANT CHANGE UP
LEUKOCYTE ESTERASE UR-ACNC: NEGATIVE — SIGNIFICANT CHANGE UP
LIDOCAIN IGE QN: 20 U/L — SIGNIFICANT CHANGE UP (ref 7–60)
LIDOCAIN IGE QN: 20 U/L — SIGNIFICANT CHANGE UP (ref 7–60)
LYMPHOCYTES # BLD AUTO: 1.8 K/UL — SIGNIFICANT CHANGE UP (ref 1–3.3)
LYMPHOCYTES # BLD AUTO: 1.8 K/UL — SIGNIFICANT CHANGE UP (ref 1–3.3)
LYMPHOCYTES # BLD AUTO: 13.4 % — SIGNIFICANT CHANGE UP (ref 13–44)
LYMPHOCYTES # BLD AUTO: 13.4 % — SIGNIFICANT CHANGE UP (ref 13–44)
MCHC RBC-ENTMCNC: 29.4 PG — SIGNIFICANT CHANGE UP (ref 27–34)
MCHC RBC-ENTMCNC: 29.4 PG — SIGNIFICANT CHANGE UP (ref 27–34)
MCHC RBC-ENTMCNC: 29.9 PG — SIGNIFICANT CHANGE UP (ref 27–34)
MCHC RBC-ENTMCNC: 29.9 PG — SIGNIFICANT CHANGE UP (ref 27–34)
MCHC RBC-ENTMCNC: 33.3 GM/DL — SIGNIFICANT CHANGE UP (ref 32–36)
MCHC RBC-ENTMCNC: 33.3 GM/DL — SIGNIFICANT CHANGE UP (ref 32–36)
MCHC RBC-ENTMCNC: 34.2 GM/DL — SIGNIFICANT CHANGE UP (ref 32–36)
MCHC RBC-ENTMCNC: 34.2 GM/DL — SIGNIFICANT CHANGE UP (ref 32–36)
MCV RBC AUTO: 87.4 FL — SIGNIFICANT CHANGE UP (ref 80–100)
MCV RBC AUTO: 87.4 FL — SIGNIFICANT CHANGE UP (ref 80–100)
MCV RBC AUTO: 88.5 FL — SIGNIFICANT CHANGE UP (ref 80–100)
MCV RBC AUTO: 88.5 FL — SIGNIFICANT CHANGE UP (ref 80–100)
MONOCYTES # BLD AUTO: 0.88 K/UL — SIGNIFICANT CHANGE UP (ref 0–0.9)
MONOCYTES # BLD AUTO: 0.88 K/UL — SIGNIFICANT CHANGE UP (ref 0–0.9)
MONOCYTES NFR BLD AUTO: 6.5 % — SIGNIFICANT CHANGE UP (ref 2–14)
MONOCYTES NFR BLD AUTO: 6.5 % — SIGNIFICANT CHANGE UP (ref 2–14)
NEUTROPHILS # BLD AUTO: 10.7 K/UL — HIGH (ref 1.8–7.4)
NEUTROPHILS # BLD AUTO: 10.7 K/UL — HIGH (ref 1.8–7.4)
NEUTROPHILS NFR BLD AUTO: 79.5 % — HIGH (ref 43–77)
NEUTROPHILS NFR BLD AUTO: 79.5 % — HIGH (ref 43–77)
NITRITE UR-MCNC: NEGATIVE — SIGNIFICANT CHANGE UP
NITRITE UR-MCNC: NEGATIVE — SIGNIFICANT CHANGE UP
NRBC # BLD: 0 /100 WBCS — SIGNIFICANT CHANGE UP (ref 0–0)
PH UR: 6.5 — SIGNIFICANT CHANGE UP (ref 5–8)
PH UR: 6.5 — SIGNIFICANT CHANGE UP (ref 5–8)
PLATELET # BLD AUTO: 206 K/UL — SIGNIFICANT CHANGE UP (ref 150–400)
PLATELET # BLD AUTO: 206 K/UL — SIGNIFICANT CHANGE UP (ref 150–400)
PLATELET # BLD AUTO: 221 K/UL — SIGNIFICANT CHANGE UP (ref 150–400)
PLATELET # BLD AUTO: 221 K/UL — SIGNIFICANT CHANGE UP (ref 150–400)
POTASSIUM SERPL-MCNC: 3.7 MMOL/L — SIGNIFICANT CHANGE UP (ref 3.5–5.3)
POTASSIUM SERPL-MCNC: 3.7 MMOL/L — SIGNIFICANT CHANGE UP (ref 3.5–5.3)
POTASSIUM SERPL-SCNC: 3.7 MMOL/L — SIGNIFICANT CHANGE UP (ref 3.5–5.3)
POTASSIUM SERPL-SCNC: 3.7 MMOL/L — SIGNIFICANT CHANGE UP (ref 3.5–5.3)
PROGEST SERPL-MCNC: 8.8 NG/ML — SIGNIFICANT CHANGE UP
PROGEST SERPL-MCNC: 8.8 NG/ML — SIGNIFICANT CHANGE UP
PROT SERPL-MCNC: 7.1 G/DL — SIGNIFICANT CHANGE UP (ref 6–8.3)
PROT SERPL-MCNC: 7.1 G/DL — SIGNIFICANT CHANGE UP (ref 6–8.3)
PROT UR-MCNC: NEGATIVE — SIGNIFICANT CHANGE UP
PROT UR-MCNC: NEGATIVE — SIGNIFICANT CHANGE UP
PROTHROM AB SERPL-ACNC: 12.6 SEC — SIGNIFICANT CHANGE UP (ref 9.5–13)
PROTHROM AB SERPL-ACNC: 12.6 SEC — SIGNIFICANT CHANGE UP (ref 9.5–13)
RBC # BLD: 4.38 M/UL — SIGNIFICANT CHANGE UP (ref 3.8–5.2)
RBC # BLD: 4.38 M/UL — SIGNIFICANT CHANGE UP (ref 3.8–5.2)
RBC # BLD: 4.59 M/UL — SIGNIFICANT CHANGE UP (ref 3.8–5.2)
RBC # BLD: 4.59 M/UL — SIGNIFICANT CHANGE UP (ref 3.8–5.2)
RBC # FLD: 12.9 % — SIGNIFICANT CHANGE UP (ref 10.3–14.5)
RBC # FLD: 12.9 % — SIGNIFICANT CHANGE UP (ref 10.3–14.5)
RBC # FLD: 13 % — SIGNIFICANT CHANGE UP (ref 10.3–14.5)
RBC # FLD: 13 % — SIGNIFICANT CHANGE UP (ref 10.3–14.5)
RH IG SCN BLD-IMP: POSITIVE — SIGNIFICANT CHANGE UP
RH IG SCN BLD-IMP: POSITIVE — SIGNIFICANT CHANGE UP
SODIUM SERPL-SCNC: 137 MMOL/L — SIGNIFICANT CHANGE UP (ref 135–145)
SODIUM SERPL-SCNC: 137 MMOL/L — SIGNIFICANT CHANGE UP (ref 135–145)
SP GR SPEC: 1.02 — SIGNIFICANT CHANGE UP (ref 1.01–1.02)
SP GR SPEC: 1.02 — SIGNIFICANT CHANGE UP (ref 1.01–1.02)
UROBILINOGEN FLD QL: NEGATIVE — SIGNIFICANT CHANGE UP
UROBILINOGEN FLD QL: NEGATIVE — SIGNIFICANT CHANGE UP
WBC # BLD: 13.47 K/UL — HIGH (ref 3.8–10.5)
WBC # BLD: 13.47 K/UL — HIGH (ref 3.8–10.5)
WBC # BLD: 9.55 K/UL — SIGNIFICANT CHANGE UP (ref 3.8–10.5)
WBC # BLD: 9.55 K/UL — SIGNIFICANT CHANGE UP (ref 3.8–10.5)
WBC # FLD AUTO: 13.47 K/UL — HIGH (ref 3.8–10.5)
WBC # FLD AUTO: 13.47 K/UL — HIGH (ref 3.8–10.5)
WBC # FLD AUTO: 9.55 K/UL — SIGNIFICANT CHANGE UP (ref 3.8–10.5)
WBC # FLD AUTO: 9.55 K/UL — SIGNIFICANT CHANGE UP (ref 3.8–10.5)

## 2023-10-18 PROCEDURE — 85025 COMPLETE CBC W/AUTO DIFF WBC: CPT

## 2023-10-18 PROCEDURE — 80053 COMPREHEN METABOLIC PANEL: CPT

## 2023-10-18 PROCEDURE — 87086 URINE CULTURE/COLONY COUNT: CPT

## 2023-10-18 PROCEDURE — 81003 URINALYSIS AUTO W/O SCOPE: CPT

## 2023-10-18 PROCEDURE — 86901 BLOOD TYPING SEROLOGIC RH(D): CPT

## 2023-10-18 PROCEDURE — 93975 VASCULAR STUDY: CPT | Mod: 26,59

## 2023-10-18 PROCEDURE — 86850 RBC ANTIBODY SCREEN: CPT

## 2023-10-18 PROCEDURE — 76770 US EXAM ABDO BACK WALL COMP: CPT | Mod: 26,59

## 2023-10-18 PROCEDURE — 88302 TISSUE EXAM BY PATHOLOGIST: CPT

## 2023-10-18 PROCEDURE — 88305 TISSUE EXAM BY PATHOLOGIST: CPT | Mod: 26

## 2023-10-18 PROCEDURE — 76770 US EXAM ABDO BACK WALL COMP: CPT

## 2023-10-18 PROCEDURE — 85730 THROMBOPLASTIN TIME PARTIAL: CPT

## 2023-10-18 PROCEDURE — 93975 VASCULAR STUDY: CPT | Mod: 26

## 2023-10-18 PROCEDURE — 76817 TRANSVAGINAL US OBSTETRIC: CPT | Mod: 26

## 2023-10-18 PROCEDURE — 76817 TRANSVAGINAL US OBSTETRIC: CPT

## 2023-10-18 PROCEDURE — 84702 CHORIONIC GONADOTROPIN TEST: CPT

## 2023-10-18 PROCEDURE — 96374 THER/PROPH/DIAG INJ IV PUSH: CPT

## 2023-10-18 PROCEDURE — C9399: CPT

## 2023-10-18 PROCEDURE — 85027 COMPLETE CBC AUTOMATED: CPT

## 2023-10-18 PROCEDURE — 76817 TRANSVAGINAL US OBSTETRIC: CPT | Mod: 26,77

## 2023-10-18 PROCEDURE — 88302 TISSUE EXAM BY PATHOLOGIST: CPT | Mod: 26

## 2023-10-18 PROCEDURE — 83690 ASSAY OF LIPASE: CPT

## 2023-10-18 PROCEDURE — 85610 PROTHROMBIN TIME: CPT

## 2023-10-18 PROCEDURE — 86900 BLOOD TYPING SEROLOGIC ABO: CPT

## 2023-10-18 PROCEDURE — 99285 EMERGENCY DEPT VISIT HI MDM: CPT | Mod: 25

## 2023-10-18 PROCEDURE — 93975 VASCULAR STUDY: CPT

## 2023-10-18 PROCEDURE — 84144 ASSAY OF PROGESTERONE: CPT

## 2023-10-18 PROCEDURE — 88305 TISSUE EXAM BY PATHOLOGIST: CPT

## 2023-10-18 PROCEDURE — 36415 COLL VENOUS BLD VENIPUNCTURE: CPT

## 2023-10-18 RX ORDER — HYDROMORPHONE HYDROCHLORIDE 2 MG/ML
0.5 INJECTION INTRAMUSCULAR; INTRAVENOUS; SUBCUTANEOUS
Refills: 0 | Status: DISCONTINUED | OUTPATIENT
Start: 2023-10-18 | End: 2023-10-18

## 2023-10-18 RX ORDER — ONDANSETRON 8 MG/1
4 TABLET, FILM COATED ORAL ONCE
Refills: 0 | Status: COMPLETED | OUTPATIENT
Start: 2023-10-18 | End: 2023-10-18

## 2023-10-18 RX ORDER — ACETAMINOPHEN 500 MG
3 TABLET ORAL
Qty: 0 | Refills: 0 | DISCHARGE

## 2023-10-18 RX ORDER — ACETAMINOPHEN 500 MG
1000 TABLET ORAL ONCE
Refills: 0 | Status: COMPLETED | OUTPATIENT
Start: 2023-10-18 | End: 2023-10-18

## 2023-10-18 RX ORDER — OXYCODONE HYDROCHLORIDE 5 MG/1
1 TABLET ORAL
Qty: 5 | Refills: 0
Start: 2023-10-18

## 2023-10-18 RX ORDER — IBUPROFEN 200 MG
3 TABLET ORAL
Qty: 0 | Refills: 0 | DISCHARGE

## 2023-10-18 RX ORDER — SODIUM CHLORIDE 9 MG/ML
1000 INJECTION, SOLUTION INTRAVENOUS
Refills: 0 | Status: DISCONTINUED | OUTPATIENT
Start: 2023-10-18 | End: 2023-10-18

## 2023-10-18 RX ORDER — ONDANSETRON 8 MG/1
4 TABLET, FILM COATED ORAL ONCE
Refills: 0 | Status: DISCONTINUED | OUTPATIENT
Start: 2023-10-18 | End: 2023-10-18

## 2023-10-18 RX ADMIN — SODIUM CHLORIDE 125 MILLILITER(S): 9 INJECTION, SOLUTION INTRAVENOUS at 14:20

## 2023-10-18 RX ADMIN — Medication 1000 MILLIGRAM(S): at 06:18

## 2023-10-18 RX ADMIN — Medication 400 MILLIGRAM(S): at 05:45

## 2023-10-18 RX ADMIN — HYDROMORPHONE HYDROCHLORIDE 0.5 MILLIGRAM(S): 2 INJECTION INTRAMUSCULAR; INTRAVENOUS; SUBCUTANEOUS at 14:20

## 2023-10-18 RX ADMIN — HYDROMORPHONE HYDROCHLORIDE 0.5 MILLIGRAM(S): 2 INJECTION INTRAMUSCULAR; INTRAVENOUS; SUBCUTANEOUS at 14:25

## 2023-10-18 NOTE — H&P ADULT - ATTENDING COMMENTS
pt seen and plan discussed. reviewed images and to proceed with diagnostic laparoscopy, removal of pregnancy tissue, right salpingectomy, suction dilation and curettage. all questions answered. informed consent signed and witnessed.     vladislav pierre

## 2023-10-18 NOTE — BRIEF OPERATIVE NOTE - TYPE OF ANESTHESIA
What Is The Reason For Today's Visit?: Full Body Skin Examination What Is The Reason For Today's Visit? (Being Monitored For X): concerning skin lesions on an annual basis General

## 2023-10-18 NOTE — ED PROVIDER NOTE - CLINICAL SUMMARY MEDICAL DECISION MAKING FREE TEXT BOX
Chica Ashraf DO (PGY-1)  Pt is a 27 y/o [id number] currently pregnant 5w2d by [facility] who presents to ED for evaluation of right abd cramping onset x2 hours ago, since resolved. Additionally reports cramping in her lower back. Denies any N/V/D, dysuria, frequency, vaginal bleeding, fevers, chills, chest pain, SOB. Pt sts she is currently undergoing fertility treatment with letrozole and trigger shots, followed by Dr. Benitez. Mentioned that she had hcg and progesterone levels checked yesterday and was told that they were low, f/u appointment tomorrow. OBGYN is Dr. Covington. No other complaints at this time.  Patient is a 28-year-old female VSS.  Physical exam shows a well-appearing, nontoxic-appearing female who is sitting up and speaking in full sentences.  Heart and lungs RRR and CTA bilaterally.  Abdomen is soft, nontender, with no rebound or guarding.  No extremity edema, rashes, lesions or bruising.  Exam is otherwise unremarkable.  Differentials include ectopic pregnancy VS.  Threatened  vs. torsion vs. kidney stone.  Dispo pending results. Chica Ashraf DO (PGY-1)  Pt is a 29 y/o [id number] currently pregnant 5w2d by [facility] who presents to ED for evaluation of right abd cramping onset x2 hours ago, since resolved. Additionally reports cramping in her lower back. Denies any N/V/D, dysuria, frequency, vaginal bleeding, fevers, chills, chest pain, SOB. Pt sts she is currently undergoing fertility treatment with letrozole and trigger shots, followed by Dr. Benitez. Mentioned that she had hcg and progesterone levels checked yesterday and was told that they were low, f/u appointment tomorrow. OBGYN is Dr. Covington. No other complaints at this time.  Patient is a 28-year-old female VSS.  Physical exam shows a well-appearing, nontoxic-appearing female who is sitting up and speaking in full sentences.  Heart and lungs RRR and CTA bilaterally.  Abdomen is soft, nontender, with no rebound or guarding.  No extremity edema, rashes, lesions or bruising.  Exam is otherwise unremarkable.  Differentials include ectopic pregnancy VS.  Threatened  vs. torsion vs. kidney stone.  Dispo pending results.    pettet attending- see attending attestation for my mdm

## 2023-10-18 NOTE — ASU DISCHARGE PLAN (ADULT/PEDIATRIC) - ASU DC SPECIAL INSTRUCTIONSFT
Postoperative Instructions    For pain control, take the followin. Ibuprofen 600mg every 6 hours, take with food  2. Add 975mg every 6 hours, alternated with ibuprofen  Tylenol and ibuprofen may be obtained over the counter.  3. Oxycodone 5mg every 6 hours as needed for severe pain. A prescription has been sent to your pharmacy.    Return to your regular way of eating.     Resume normal activity as tolerated, but no heavy lifting or strenuous activity for 6 weeks. Complete vaginal rest, no tampons, no douching, no tub bathing, no sexual activities for 6 weeks unless otherwise instructed by your doctor.      No driving while on narcotic pain medication.      Call your doctor with any signs and symptoms of infection such as fever (>100.4 F), chills, nausea or vomiting.  Call your doctor if you're unable to tolerate food or have difficulty urinating.  Call your doctor if you have pain that is not relieved by your prescribed medications. Call your doctor with redness or swelling at the incision site, fluid leakage or wound separation.    Notify your doctor with any other concerns. Follow up with your doctor in 2 weeks for a post-operative appointment. Postoperative Instructions    For pain control, take the followin. Ibuprofen 600mg every 6 hours, take with food  2. Add 975mg every 6 hours, alternated with ibuprofen  Tylenol and ibuprofen may be obtained over the counter.  3. Oxycodone 5mg every 6 hours as needed for severe pain. A prescription has been sent to your pharmacy.    Return to your regular way of eating.     Resume normal activity as tolerated, but no heavy lifting or strenuous activity for 6 weeks. Complete vaginal rest, no tampons, no douching, no tub bathing, no sexual activities for 6 weeks unless otherwise instructed by your doctor.      No driving while on narcotic pain medication.      Call your doctor with any signs and symptoms of infection such as fever (>100.4 F), chills, nausea or vomiting.  Call your doctor if you're unable to tolerate food or have difficulty urinating.  Call your doctor if you have pain that is not relieved by your prescribed medications. Call your doctor with redness or swelling at the incision site, fluid leakage or wound separation.    Notify your doctor with any other concerns. Follow up with Dr. Aguilar in 2 weeks for a post-operative appointment and with Dr. Benitez on Monday.

## 2023-10-18 NOTE — ED ADULT NURSE REASSESSMENT NOTE - NS ED NURSE REASSESS COMMENT FT1
0955 To go to OR.  NPO. Had a few ounces of water approx 45 min ago.  A&Ox4. Pt tearful. Color pink. Skin W&D. States no vag bleeding at present. Minimal pain at present 2nd IV line to be started per

## 2023-10-18 NOTE — ED PROVIDER NOTE - PHYSICAL EXAMINATION
General: No apparent distress. Nontoxic, well appearing. Speaking in full sentences.  Neck: Supple. Full passive ROM. No tenderness.  Cardiac: RRR. No M, G, R  Pulmonary: CTA bilaterally. No increased WOB.  Abdominal: Soft, nondistended, non-rigid, non-tender. No palpable masses.  Neurologic: No focal sensory or motor deficits. Moves all 4 extremities.  Musculoskeletal: Strength appropriate in all 4 extremities for age with no limited ROM. No visible deformities.  Skin: Color appropriate for race. Intact, warm, and well-perfused. No visible lesions or bruising.  Psychiatric: x3. Appropriate mood and affect. No apparent risk to self or others.

## 2023-10-18 NOTE — H&P ADULT - NSHPPHYSICALEXAM_GEN_ALL_CORE
Vital Signs Last 24 Hrs  T(C): 37.2 (18 Oct 2023 11:08), Max: 37.2 (18 Oct 2023 11:08)  T(F): 99 (18 Oct 2023 11:08), Max: 99 (18 Oct 2023 11:08)  HR: 108 (18 Oct 2023 11:08) (81 - 116)  BP: 116/80 (18 Oct 2023 11:08) (111/57 - 127/87)  BP(mean): 101 (18 Oct 2023 09:15) (101 - 101)    Physical Exam:   General: sitting comfortably in bed, NAD   CV: well perfused  Lungs: breathing comfortably on RA   Abd: Soft, mildly tender in RLQ, non-distended. No rebound or guarding.  : no bleeding on pad.    External labia wnl.  Bimanual exam with no cervical motion tenderness, uterus wnl, adnexa non palpable b/l and nontender.  Speculum Exam: deferred  Ext: non-tender b/l, no edema

## 2023-10-18 NOTE — CHART NOTE - NSCHARTNOTEFT_GEN_A_CORE
R2 Post-op Check    Patient seen and examined at bedside. No acute complaints. Pain well controlled. Patient tolerating clears and crackers. Has not yet passed flatus or voided. Denies CP, SOB, N/V, fevers, and chills.    Vital Signs Last 24 Hours  T(C): 37.3 (10-18-23 @ 13:55), Max: 37.3 (10-18-23 @ 13:55)  HR: 97 (10-18-23 @ 15:45) (81 - 116)  BP: 95/55 (10-18-23 @ 15:45) (90/51 - 127/87)  RR: 14 (10-18-23 @ 15:30) (12 - 20)  SpO2: 100% (10-18-23 @ 15:45) (96% - 100%)    Physical Exam:  General: NAD  CV: clinically well perfused  Lungs: unlabored respirations  Abdomen: Soft, non-distended, appropriately tender, no rebound or guarding  Incision: 3x LSC port sites CDI  : Minimal vaginal bleeding on pad  Ext: No pain or swelling    Labs:             13.1   9.55  )-----------( 206      ( 10-18 @ 09:53 )             38.3                13.5   13.47 )-----------( 221      ( 10-18 @ 00:58 )             40.6       MEDICATIONS  (STANDING):  lactated ringers. 1000 milliLiter(s) (125 mL/Hr) IV Continuous <Continuous>    MEDICATIONS  (PRN):  HYDROmorphone  Injectable 0.5 milliGRAM(s) IV Push every 10 minutes PRN Severe Pain (7 - 10)  ondansetron Injectable 4 milliGRAM(s) IV Push once PRN Nausea and/or Vomiting      A/P: 27yo POD#0 s/p LSC RS and endometrial biopsy for ectopic pregnancy. Patient is hemodynamically stable and progressing appropriately in the acute post-operative setting.  Neuro: Tylenol, Motrin, Oxy PRN for pain control  CV: Hemodynamically stable  Pulm: Saturating well on room air. Encourage incentive spirometry  GI: Advance to regular diet  : DTV@9p  Heme: SCDs/OOB for DVT ppx  ID: No active issues  Endo: No active issues  Dispo: Stable for discharge home once meeting all post-operative milestones, including voiding. Patient instructed to f/u with Dr. Aguilar in 2 weeks for post-op appt and with her BEATRIZ Dr. Benitez on Monday    D/w Dr. Jeff Benoit, PGY-2 R2 Post-op Check    Patient seen and examined at bedside. No acute complaints. Pain well controlled. Patient tolerating clears and crackers. Has not yet passed flatus or voided. Denies CP, SOB, N/V, fevers, and chills.    Vital Signs Last 24 Hours  T(C): 37.3 (10-18-23 @ 13:55), Max: 37.3 (10-18-23 @ 13:55)  HR: 97 (10-18-23 @ 15:45) (81 - 116)  BP: 95/55 (10-18-23 @ 15:45) (90/51 - 127/87)  RR: 14 (10-18-23 @ 15:30) (12 - 20)  SpO2: 100% (10-18-23 @ 15:45) (96% - 100%)    Physical Exam:  General: NAD  CV: clinically well perfused  Lungs: unlabored respirations  Abdomen: Soft, non-distended, appropriately tender, no rebound or guarding  Incision: 3x LSC port sites CDI  : Minimal vaginal bleeding on pad  Ext: No pain or swelling    Labs:             13.1   9.55  )-----------( 206      ( 10-18 @ 09:53 )             38.3                13.5   13.47 )-----------( 221      ( 10-18 @ 00:58 )             40.6       MEDICATIONS  (STANDING):  lactated ringers. 1000 milliLiter(s) (125 mL/Hr) IV Continuous <Continuous>    MEDICATIONS  (PRN):  HYDROmorphone  Injectable 0.5 milliGRAM(s) IV Push every 10 minutes PRN Severe Pain (7 - 10)  ondansetron Injectable 4 milliGRAM(s) IV Push once PRN Nausea and/or Vomiting      A/P: 27yo POD#0 s/p LSC RS and endometrial biopsy for ectopic pregnancy. Patient is hemodynamically stable and progressing appropriately in the acute post-operative setting.  Neuro: Tylenol, Motrin, Oxy PRN for pain control  CV: Hemodynamically stable  Pulm: Saturating well on room air. Encourage incentive spirometry  GI: Advance to regular diet  : DTV@9p  Heme: SCDs/OOB for DVT ppx  ID: No active issues  Endo: No active issues  Dispo: Stable for discharge home once meeting all post-operative milestones, including voiding. Patient instructed to f/u with Dr. Aguilar in 2 weeks for post-op appt and with her BEATRIZ Dr. Benitez on Monday    D/w GYN team  Celia Benoit, PGY-2

## 2023-10-18 NOTE — ED PROVIDER NOTE - PROGRESS NOTE DETAILS
pettet attending- OB evaluated patient and recommended repeat blood work at 8 AM CBC hCG as well as repeat transvaginal ultrasound evaluate for increasing complex free pelvic fluid for possible ruptured ectopic pregnancy otherwise patient complained of minimal pain improved with Ofirmev OB to follow patient to be signed out to day team for further management Fellow MD Curt Benton: Received a call from radiologist who reports increasing hematosalpynx with continued complex right adnexal mass concerning for ectopic pregnancy. New complex fluid accumulating in RLQ. OB paged. Fellow MD Curt Benton: Repeat vitals: Pt tachycardic 110-130s, /80, abd soft and non tender. Pt subjectively feeling better. Discussed case with OB team who will take the patient to the OR for suspected ruptured ectopic pregnancy under Dr. Grant.

## 2023-10-18 NOTE — ASU DISCHARGE PLAN (ADULT/PEDIATRIC) - CARE PROVIDER_API CALL
Shivani Aguilar  Obstetrics and Gynecology  [address] [place] [address], [address]  [place] [address]  Phone: [phone]  Fax: [phone]  Follow Up Time: 2 weeks   Shivani Aguilar  Obstetrics and Gynecology  [address] [facility], [address]  [place] [address]  Phone: [phone]  Fax: [phone]  Follow Up Time: 2 weeks    Sofía Benitez  Reproductive Endo/Infertility  [address] [facility] [place] [address]  Phone: [phone]  Fax: [phone]  Follow Up Time: 1-3 days

## 2023-10-18 NOTE — ED PROVIDER NOTE - ATTENDING CONTRIBUTION TO CARE
I, Arash Yuan, performed a history and physical exam of the patient and discussed their management with the resident and/or advanced care provider. I reviewed the resident and/or advanced care provider's note and agree with the documented findings and plan of care. I was present and available for all procedures.    27 y/o [id number] currently pregnant 5w2d by LNMP who presents to ED for evaluation of right abd cramping onset x2 hours ago, since resolved. Additionally reports cramping in her lower back. Denies any N/V/D, dysuria, frequency, vaginal bleeding, fevers, chills, chest pain, SOB.     Well appearing and in NAD, head normal appearing atraumatic, trachea midline, no respiratory distress, lungs cta bilaterally, rrr no murmurs, soft NT ND abdomen, no visible extremity deformities, Alert and oriented, non focal neuro exam, skin warm and dry, normal affect and mood, no leg swelling, calf ttp or jvd no cva ttp    We will locate pregnancy with ultrasound otherwise screening blood work type and screen urine analysis unlikely ACS PE pneumothorax dissection AAA pneumonia pyelonephritis or nephrolithiasis we will also obtain renal ultrasound rule out hydronephrosis otherwise pain medication as needed disposition pending work-up and reevaluation as well as OB/GYN consultation discussed patient agreed with plan

## 2023-10-18 NOTE — ED ADULT NURSE NOTE - OBJECTIVE STATEMENT
28y F PMH PCOS presents to the ED c/o abd pain. Pt 5wks pregnant. LMP 9/10/23. Pt reports R sided cramping [id number] radiating to the lower back. Pt currently undergoing fertility tx. Denies vaginal bleeding, nvd, cp, sob, cough, fevers, chills. Pt reports hx 2miscarriages. Family at bedside. Comfort and safety maintained.

## 2023-10-18 NOTE — ED ADULT NURSE REASSESSMENT NOTE - NS ED NURSE REASSESS COMMENT FT1
0717 28 yr old WF in ER r.ed/donna rm 38.  at [facility]. Pt awaiting ultrasound. A&Ox4. Minimal pain at present 2/10 RLQ. States mild vag bleeding. IVL intact R hand without sx of infilt. 0717 28 yr old WF in ER r.ed/donna rm 38.  at [facility]. Pt awaiting ultrasound. A&Ox4. Minimal pain at present 2/10 RLQ. States mild vag bleeding/spotting. IVL intact R hand without sx of infilt.

## 2023-10-18 NOTE — ASU DISCHARGE PLAN (ADULT/PEDIATRIC) - DISCHARGE PLAN IS COMPLETE AND GIVEN TO PATIENT
10/25/22 PT HAD PRESSURE PAIN AND BLACK VA POST AVASTIN, INSTILLED 1 GTTS OF VYZULTA. PT IS CF AND FEELING BETTER. : Yes

## 2023-10-18 NOTE — ASU DISCHARGE PLAN (ADULT/PEDIATRIC) - PROVIDER TOKENS
PROVIDER:[TOKEN:[[id number]],FOLLOWUP:[2 weeks]] PROVIDER:[TOKEN:[[id number]],FOLLOWUP:[2 weeks]],PROVIDER:[TOKEN:[[id number]],FOLLOWUP:[1-3 days]]

## 2023-10-18 NOTE — CONSULT NOTE ADULT - ASSESSMENT
P:  - Rh (+), no need for rhogam  -Discussed with patient that this pregnancy may represent an early pregnancy, a non-viable pregnancy, or an ectopic pregnancy  -Given the morbidity associated w/ ectopic pregnancy, we will follow the patient closely to trend the b-hcg levels and obtain repeat US as indicated; counseled the patient extensively re: the importance of follow-up  -Patient to return in 48 hours for a repeat b-hcg/sono in the ED or in the office  -Precautions given  -All questions answered to the apparent satisfaction of the patient    Patient seen and d/w   Dorys Myrick, PGY2        THIS NOTE IS INCOMPLETE 28y  5w3d by LMP 9/10/23 presents for r/o ectopic pregnancy. TVUS suggests ectopic pregnancy in R adnexa, possibly ruptured with small fluid in pelvis. Pt hemodynamically stable. Pt with unimpressive abdominal exam.     P:  -Discussed with patient that this pregnancy is likely an ectopic pregnancy  -Pt clinically does not appear to be ruptured, however her cramping pain is cause for concern  -H/H 13.5/40.6  -Rh+, no need for rhogam  -Pt to have repeat b-hcg/sono/CBC at 8a  -Pending repeat imaging and clinical picture, will proceed with MTX vs laparoscopy  -Should pt develop worsening abdominal pain, please page GYN or call [phone]      Patient seen and d/w Adria Myrick, PGY2        THIS NOTE IS INCOMPLETE 28y  5w3d by LMP 9/10/23 presents for r/o ectopic pregnancy. TVUS suggests ectopic pregnancy in R adnexa, possibly ruptured with small fluid in pelvis. Pt hemodynamically stable. Pt with unimpressive abdominal exam.     P:  -Discussed with patient that this pregnancy is likely an ectopic pregnancy, however may be a nonviable intrauterine pregnancy too small to visualize on ultrasound  -Pt clinically does not appear to be ruptured, however her cramping pain is cause for concern  -H/H 13.5/40.6  -Rh+, no need for rhogam  -bHC (10/16) -> 39.3 (10/18)  -Pt to have repeat b-hcg/sono/CBC at 8a  -Pending repeat imaging and clinical picture, will proceed with MTX vs laparoscopy  -Should pt develop worsening abdominal pain, please page GYN or call [facility] [phone]      Patient seen and d/w Adria Myrick, PGY2

## 2023-10-18 NOTE — CONSULT NOTE ADULT - SUBJECTIVE AND OBJECTIVE BOX
MAUREEN SOLIMAN  28y  Female [id number]    HPI:        Name of GYN Physician:   OBHx:    GynHx: Denies fibroids, cysts, endometriosis, STI's, Abnormal pap smears   PMH:  PSH:  Meds:  Allx:  Social History:  Denies smoking use, drug use, alcohol use.   +occasional social alcohol use    Vital Signs Last 24 Hrs  T(C): --  T(F): --  HR: 81 (17 Oct 2023 23:23) (81 - 81)  BP: 112/76 (17 Oct 2023 23:23) (112/76 - 112/76)  BP(mean): --  RR: 20 (17 Oct 2023 23:23) (20 - 20)  SpO2: 98% (17 Oct 2023 23:23) (98% - 98%)    Parameters below as of 17 Oct 2023 23:23  Patient On (Oxygen Delivery Method): room air        Physical Exam:   General: sitting comfortably in bed, NAD   HEENT: neck supple, full ROM  CV: RRR  Lungs: CTA b/l, good air flow b/l   Back: No CVA tenderness  Abd: Soft, non-tender, non-distended.  Bowel sounds present.    :  No bleeding on pad.    External labia wnl.  Bimanual exam with no cervical motion tenderness, uterus wnl, adnexa non palpable b/l.  Cervix closed vs. Cervix dilated  Speculum Exam: No active bleeding from os.  Physiologic discharge.    Ext: non-tender b/l, no edema     LABS:                              13.5   13.47 )-----------( 221      ( 18 Oct 2023 00:58 )             40.6     10-18    137  |  101  |  15  ----------------------------<  105<H>  3.7   |  22  |  0.61    Ca    9.4      18 Oct 2023 00:58    TPro  7.1  /  Alb  4.5  /  TBili  0.4  /  DBili  x   /  AST  11  /  ALT  10  /  AlkPhos  64  10-18    I&O's Detail      Urinalysis Basic - ( 18 Oct 2023 01:41 )    Color: Light Yellow / Appearance: Clear / S.020 / pH: x  Gluc: x / Ketone: Trace  / Bili: Negative / Urobili: Negative   Blood: x / Protein: Negative / Nitrite: Negative   Leuk Esterase: Negative / RBC: x / WBC x   Sq Epi: x / Non Sq Epi: x / Bacteria: x        RADIOLOGY & ADDITIONAL STUDIES:    < from: US Transvaginal, OB (10.18.23 @ 01:38) >    PROCEDURE DATE:  10/18/2023          INTERPRETATION:  CLINICAL INFORMATION: 5 weeks pregnant with right pelvic   pain.    LMP: 09/10/2023    Estimated Gestational Age by LMP: 5 weeks, 3 days.    COMPARISON: None available.    Endovaginal and transabdominal pelvic sonogram. Color and Spectral   Doppler was performed.    FINDINGS:  Uterus: 9.3 x 5.3 x 5.7cm. No intrauterine gestation is identified.  Endometrium: Thickened up to 12 mm, with a 2 mm cystic focus within the   endometrium.    Right ovary: 3.2 cm x 2.0 cm x 1.9 cm. Within normal limits. Normal   arterial and venous waveforms. Heterogeneousechogenic structure in the   region of the right adnexa measuring approximately 3.7 x 1.9 x 2.4 cm  Left ovary: 3.8 cm x 2.4 cm x 3.1 cm. Within normal limits. Normal   arterial and venous waveforms. 1.9 cm corpus luteal cyst in the left   ovary.    Fluid: Small volume complex free fluid in the bilateral adnexa and   posterior cul-de-sac.    IMPRESSION:  Neither an intrauterine nor extrauterine gestation is identified. This   represents a pregnancy of indeterminate location. Differential diagnosis   includes early normal IUP, pregnancy failure or ectopic pregnancy. Serial   hCG and ultrasound are recommended to determine the significance of these   findings.    Indeterminant heterogeneous echogenic structure in the region of the   right adnexa, separate from the ovary. No discrete vascularity   identified. Short-term follow-up sonographic evaluation is recommended.        --- End of Report ---          AUGUST BARAJAS DO; Resident Radiologist  This document has been electronically signed.  SHELDON ALLAN MD; Attending Radiologist  This document has been electronically signed. Oct 18 2023  2:40AM    < end of copied text >   MAUREEN SOLIMAN  28y  Female [id number]    HPI:  28y  5w3d by LMP 9/10/23 presents with abdominal pain. Pt has been seeing Dr. Benitez from [facility], doing Letrozole and timed intercourse to get pregnant. Pt states that she was in her usual state of health until 10:30pm on 10/17 when she developed severe abdominal pain in her RLQ. The pain lasted for 15minutes before abating. Patient then had recurrent episodes of pain, lasting about 15 minutes each but not as severe as the first episode. She took Tylenol at home which seemed to help. She did not have any associated nausea or vomiting. Upon arrival in the ED, pt no longer had pain. She remained without pain for at least 4 hours until time of this evaluation when the pain returned. Pt describes the pain as cramping in nature and it radiates from her RLQ down her leg and to her back. She noticed some vaginal spotting when she used the bathroom in the ED. Denies fever, chills, SOB, palpitations, dizziness/lightheadedness, purulent discharge.       Name of GYN Physician: Adria  OBHx:    2019       SAB x1   MAB w D&C  GynHx: h/o PCOS. Denies fibroids, endometriosis, STI's, Abnormal pap smears   PMH: hypothyroidism associated with pregnancy  PSH: D&C x1  Meds: synthroid, PNV, vitD  Allx: NKDA  Social History:  Denies smoking use, drug use, alcohol use.     Vital Signs Last 24 Hrs  T(C): --  T(F): --  HR: 81 (17 Oct 2023 23:23) (81 - 81)  BP: 112/76 (17 Oct 2023 23:23) (112/76 - 112/76)  BP(mean): --  RR: 20 (17 Oct 2023 23:23) (20 - 20)  SpO2: 98% (17 Oct 2023 23:23) (98% - 98%)    Parameters below as of 17 Oct 2023 23:23  Patient On (Oxygen Delivery Method): room air        Physical Exam:   General: sitting comfortably in bed, NAD   CV: well perfused  Lungs: breathing comfortably on RA   Abd: Soft, mildly tender in RLQ, non-distended. No rebound or guarding.  : no bleeding on pad.    External labia wnl.  Bimanual exam with no cervical motion tenderness, uterus wnl, adnexa non palpable b/l and nontender.  Speculum Exam: deferred  Ext: non-tender b/l, no edema     LABS:                              13.5   13.47 )-----------( 221      ( 18 Oct 2023 00:58 )             40.6     10-18    137  |  101  |  15  ----------------------------<  105<H>  3.7   |  22  |  0.61    Ca    9.4      18 Oct 2023 00:58    TPro  7.1  /  Alb  4.5  /  TBili  0.4  /  DBili  x   /  AST  11  /  ALT  10  /  AlkPhos  64  10-18    I&O's Detail      Urinalysis Basic - ( 18 Oct 2023 01:41 )    Color: Light Yellow / Appearance: Clear / S.020 / pH: x  Gluc: x / Ketone: Trace  / Bili: Negative / Urobili: Negative   Blood: x / Protein: Negative / Nitrite: Negative   Leuk Esterase: Negative / RBC: x / WBC x   Sq Epi: x / Non Sq Epi: x / Bacteria: x        RADIOLOGY & ADDITIONAL STUDIES:    < from: US Transvaginal, OB (10.18.23 @ 01:38) >    PROCEDURE DATE:  10/18/2023          INTERPRETATION:  CLINICAL INFORMATION: 5 weeks pregnant with right pelvic   pain.    LMP: 09/10/2023    Estimated Gestational Age by LMP: 5 weeks, 3 days.    COMPARISON: None available.    Endovaginal and transabdominal pelvic sonogram. Color and Spectral   Doppler was performed.    FINDINGS:  Uterus: 9.3 x 5.3 x 5.7cm. No intrauterine gestation is identified.  Endometrium: Thickened up to 12 mm, with a 2 mm cystic focus within the   endometrium.    Right ovary: 3.2 cm x 2.0 cm x 1.9 cm. Within normal limits. Normal   arterial and venous waveforms. Heterogeneousechogenic structure in the   region of the right adnexa measuring approximately 3.7 x 1.9 x 2.4 cm  Left ovary: 3.8 cm x 2.4 cm x 3.1 cm. Within normal limits. Normal   arterial and venous waveforms. 1.9 cm corpus luteal cyst in the left   ovary.    Fluid: Small volume complex free fluid in the bilateral adnexa and   posterior cul-de-sac.    IMPRESSION:  Neither an intrauterine nor extrauterine gestation is identified. This   represents a pregnancy of indeterminate location. Differential diagnosis   includes early normal IUP, pregnancy failure or ectopic pregnancy. Serial   hCG and ultrasound are recommended to determine the significance of these   findings.    Indeterminant heterogeneous echogenic structure in the region of the   right adnexa, separate from the ovary. No discrete vascularity   identified. Short-term follow-up sonographic evaluation is recommended.        --- End of Report ---          AUGUST BARAJAS DO; Resident Radiologist  This document has been electronically signed.  SHELDON ALLAN MD; Attending Radiologist  This document has been electronically signed. Oct 18 2023  2:40AM    < end of copied text >   MAUREEN SOLIMAN  28y  Female [id number]    HPI:  28y  5w3d by LMP 9/10/23 presents with abdominal pain. Pt has been seeing Dr. Benitez from [facility], doing Letrozole and timed intercourse to get pregnant. bHCG from 10/16 was 41. Pt states that she was in her usual state of health until 10:30pm on 10/17 when she developed severe abdominal pain in her RLQ. The pain lasted for 15minutes before abating. Patient then had recurrent episodes of pain, lasting about 15 minutes each but not as severe as the first episode. She took Tylenol at home which seemed to help. She did not have any associated nausea or vomiting. Upon arrival in the ED, pt no longer had pain. She remained without pain for at least 4 hours until time of this evaluation when the pain returned. Pt describes the pain as cramping in nature and it radiates from her RLQ down her leg and to her back. She noticed some vaginal spotting when she used the bathroom in the ED. Denies fever, chills, SOB, palpitations, dizziness/lightheadedness, purulent discharge.       Name of GYN Physician: Adria  OBHx:    2019       SAB x1   MAB w D&C  GynHx: h/o PCOS. Denies fibroids, endometriosis, STI's, Abnormal pap smears   PMH: hypothyroidism associated with pregnancy  PSH: D&C x1  Meds: synthroid, PNV, vitD  Allx: NKDA  Social History:  Denies smoking use, drug use, alcohol use.     Vital Signs Last 24 Hrs  T(C): --  T(F): --  HR: 81 (17 Oct 2023 23:23) (81 - 81)  BP: 112/76 (17 Oct 2023 23:23) (112/76 - 112/76)  BP(mean): --  RR: 20 (17 Oct 2023 23:23) (20 - 20)  SpO2: 98% (17 Oct 2023 23:23) (98% - 98%)    Parameters below as of 17 Oct 2023 23:23  Patient On (Oxygen Delivery Method): room air        Physical Exam:   General: sitting comfortably in bed, NAD   CV: well perfused  Lungs: breathing comfortably on RA   Abd: Soft, mildly tender in RLQ, non-distended. No rebound or guarding.  : no bleeding on pad.    External labia wnl.  Bimanual exam with no cervical motion tenderness, uterus wnl, adnexa non palpable b/l and nontender.  Speculum Exam: deferred  Ext: non-tender b/l, no edema     LABS:    bHC.3                          13.5   13.47 )-----------( 221      ( 18 Oct 2023 00:58 )             40.6     10-18    137  |  101  |  15  ----------------------------<  105<H>  3.7   |  22  |  0.61    Ca    9.4      18 Oct 2023 00:58    TPro  7.1  /  Alb  4.5  /  TBili  0.4  /  DBili  x   /  AST  11  /  ALT  10  /  AlkPhos  64  10-18    I&O's Detail      Urinalysis Basic - ( 18 Oct 2023 01:41 )    Color: Light Yellow / Appearance: Clear / S.020 / pH: x  Gluc: x / Ketone: Trace  / Bili: Negative / Urobili: Negative   Blood: x / Protein: Negative / Nitrite: Negative   Leuk Esterase: Negative / RBC: x / WBC x   Sq Epi: x / Non Sq Epi: x / Bacteria: x        RADIOLOGY & ADDITIONAL STUDIES:    < from: US Transvaginal, OB (10.18.23 @ 01:38) >    PROCEDURE DATE:  10/18/2023          INTERPRETATION:  CLINICAL INFORMATION: 5 weeks pregnant with right pelvic   pain.    LMP: 09/10/2023    Estimated Gestational Age by LMP: 5 weeks, 3 days.    COMPARISON: None available.    Endovaginal and transabdominal pelvic sonogram. Color and Spectral   Doppler was performed.    FINDINGS:  Uterus: 9.3 x 5.3 x 5.7cm. No intrauterine gestation is identified.  Endometrium: Thickened up to 12 mm, with a 2 mm cystic focus within the   endometrium.    Right ovary: 3.2 cm x 2.0 cm x 1.9 cm. Within normal limits. Normal   arterial and venous waveforms. Heterogeneousechogenic structure in the   region of the right adnexa measuring approximately 3.7 x 1.9 x 2.4 cm  Left ovary: 3.8 cm x 2.4 cm x 3.1 cm. Within normal limits. Normal   arterial and venous waveforms. 1.9 cm corpus luteal cyst in the left   ovary.    Fluid: Small volume complex free fluid in the bilateral adnexa and   posterior cul-de-sac.    IMPRESSION:  Neither an intrauterine nor extrauterine gestation is identified. This   represents a pregnancy of indeterminate location. Differential diagnosis   includes early normal IUP, pregnancy failure or ectopic pregnancy. Serial   hCG and ultrasound are recommended to determine the significance of these   findings.    Indeterminant heterogeneous echogenic structure in the region of the   right adnexa, separate from the ovary. No discrete vascularity   identified. Short-term follow-up sonographic evaluation is recommended.        --- End of Report ---          AUGUST BARAJAS DO; Resident Radiologist  This document has been electronically signed.  SHELDON ALLAN MD; Attending Radiologist  This document has been electronically signed. Oct 18 2023  2:40AM    < end of copied text >

## 2023-10-18 NOTE — PROGRESS NOTE ADULT - SUBJECTIVE AND OBJECTIVE BOX
Gyn Progress Note    Subjective:   Pt seen and examined at bedside at ~615am. States her pain is much improved after IV Tylenol. Now states her pain is 4/10 (was previously 7/10). Hasn't eaten anything since last night. Pt denies fever, chills, chest pain, SOB, nausea, vomiting, lightheadedness, dizziness.      Objective:  T(F): 98.4 (10-18-23 @ 05:30), Max: 98.4 (10-18-23 @ 05:30)  HR: 107 (10-18-23 @ 05:30) (81 - 107)  BP: 113/81 (10-18-23 @ 05:30) (112/76 - 113/81)  RR: 15 (10-18-23 @ 05:30) (15 - 20)  SpO2: 98% (10-18-23 @ 05:30) (98% - 98%)  I&O's Summary    Physical Exam:  Constitutional: sitting comfortably in bed, NAD  CV: clinically well perfused  Lungs: unlabored respirations  Abdomen: soft, ND, non-tender throughout abdomen, no rebound or guarding  Extremities: no swelling or pain    LABS:  10-18    137    |  101    |  15     ----------------------------<  105<H>  3.7     |  22     |  0.61     Ca    9.4        18 Oct 2023 00:58    TPro  7.1    /  Alb  4.5    /  TBili  0.4    /  DBili  x      /  AST  11     /  ALT  10     /  AlkPhos  64     10-18        PT/INR - ( 18 Oct 2023 06:04 )   PT: 12.6 sec;   INR: 1.15 ratio         PTT - ( 18 Oct 2023 06:04 )  PTT:34.3 sec  Urinalysis Basic - ( 18 Oct 2023 01:41 )    Color: Light Yellow / Appearance: Clear / S.020 / pH: x  Gluc: x / Ketone: Trace  / Bili: Negative / Urobili: Negative   Blood: x / Protein: Negative / Nitrite: Negative   Leuk Esterase: Negative / RBC: x / WBC x   Sq Epi: x / Non Sq Epi: x / Bacteria: x

## 2023-10-18 NOTE — ASU DISCHARGE PLAN (ADULT/PEDIATRIC) - MEDICATION INSTRUCTIONS
Alternate using Ibuprofen 600mg every 6 hours and Tylenol 975mg (2 extra strength or 3 regular tabs) every 6 hours for pain. Example pain schedule as follows: 9AM Tylenol 975mg, 12PM Ibuprofen 600mg, 3PM Tylenol 975mg, 6PM Ibuprofen 600mg, etc.

## 2023-10-18 NOTE — BRIEF OPERATIVE NOTE - OPERATION/FINDINGS
EUA: grossly normal external genitalia, normal cervix, no adnexal masses palpated, anteverted uterus ~8w size.  LSC: Entry site atraumatic. Grossly normal uterus, bilateral ovaries, and left fallopian tube. Right fallopian tube dilated with aborting ectopic and ~30cc hemoperitoneum. Grossly normal liver edge, stomach, and bowel.

## 2023-10-18 NOTE — ED ADULT NURSE NOTE - NSFALLUNIVINTERV_ED_ALL_ED
Bed/Stretcher in lowest position, wheels locked, appropriate side rails in place/Call bell, personal items and telephone in reach/Instruct patient to call for assistance before getting out of bed/chair/stretcher/Non-slip footwear applied when patient is off stretcher/[place] to call system/Physically safe environment - no spills, clutter or unnecessary equipment/Purposeful proactive rounding/Room/bathroom lighting operational, light cord in reach

## 2023-10-18 NOTE — BRIEF OPERATIVE NOTE - NSICDXBRIEFPROCEDURE_GEN_ALL_CORE_FT
PROCEDURES:  Laparoscopic right salpingectomy for ectopic pregnancy 18-Oct-2023 14:08:49  Celia Benoit  Endometrial biopsy 18-Oct-2023 14:09:03  Celia Benoit

## 2023-10-18 NOTE — PROGRESS NOTE ADULT - ASSESSMENT
A/P: 28y  5w3d by LMP 9/10/23 presents for r/o ectopic pregnancy. This morning, patient states her pain is markedly improved after receiving IV Tylenol at 545a. Abdominal exam wnl with no tenderness throughout with deep palpation. TVUS suggests possible ectopic pregnancy in R adnexa. Pt hemodynamically stable.    Neuro: pain control with Tylenol  CV: Hemodynamically stable, f/u CBC at 8a  Pulm: Saturating well on RA  GI: NPO  : Voiding Spontaneously  GYN:  - bHC (10/16) -> 39.3 (10/18), f/u 8am bHCG  - TVUS (10/18): Uterus: 9.3 x 5.3 x 5.7 cm. No intrauterine gestation is identified. Endometrium: Thickened up to 12 mm, with a 2 mm cystic focus within the endometrium. Right ovary: 3.2 cm x 2.0 cm x 1.9 cm. Within normal limits. Normal arterial and venous waveforms. Heterogeneous echogenic structure in the region of the right adnexa measuring approximately 3.7 x 1.9 x 2.4 cm. Left ovary: 3.8 cm x 2.4 cm x 3.1 cm. Within normal limits. Normal arterial and venous waveforms. 1.9 cm corpus luteal cyst in the left ovary. Fluid: Small volume complex free fluid in the bilateral adnexa and posterior cul-de-sac.  - f/u 8am repeat TVUS  Heme: OOB for VTE prophylaxis  ID: Afebrile  Endo: No active issues  Dispo: continue management per ED. Pending repeat labs and imaging, plan includes MTX vs. laparoscopy. Should pt develop worsening abdominal pain, please page GYN or call [facility] [phone]    Patient seen with GYN team  Celia Benoit, PGY-2

## 2023-10-18 NOTE — H&P ADULT - ASSESSMENT
A/P: 28y  5w3d by LMP 9/10/23 presents with abdominal pain. TVUS demonstrates possible R ectopic pregnancy and increased pelvic free fluid with concern for possible rupture or impending rupture. Plan to admit to GYN for operative management of ectopic pregnancy.    Neuro: IV pain medication prn  CV: Patient hemodynamically stable- will continue to monitor vitals closely.   Pulm: saturating well on room air   GI: NPO for OR   : Arroyo to be placed intra-operatively.   Reproductive: patient to go to OR for diagnostic laparoscopy, removal of pregnancy, right salpingectomy, suction D&C.  Patient counseled on risks of surgery including bleeding, infection and damage to surrounding organs.  All questions/concerns of patient addressed. All consents signed with patient.    Heme: SCD's in OR for DVT ppx.  Aggressive and early ambulation post-operatively for DVT ppx.   ID: afebrile   FEN: LR@125.  Replete electolytes prn   Dispo: To OR for procedure as detailed above    Celia Benoit, PGY-2  Patient seen and d/w  Dr. Aguilar    A/P: 28y  5w3d by LMP 9/10/23 presents with abdominal pain. TVUS demonstrates possible R ectopic pregnancy and increased pelvic free fluid with concern for possible rupture or impending rupture. Plan to admit to GYN for operative management of ectopic pregnancy.    Neuro: IV pain medication prn  CV: Patient hemodynamically stable- will continue to monitor vitals closely.   Pulm: saturating well on room air   GI: NPO for OR   : Arroyo to be placed intra-operatively.   Reproductive: patient to go to OR for diagnostic laparoscopy, removal of pregnancy, right salpingectomy, suction D&C.  Patient counseled on risks of surgery including bleeding, infection and damage to surrounding organs.  All questions/concerns of patient addressed. All consents signed with patient.    Heme: SCD's in OR for DVT ppx.  Aggressive and early ambulation post-operatively for DVT ppx.   ID: afebrile   FEN: LR@125.  Replete electolytes prn   Dispo: To OR for procedure as detailed above    Celia Benoit, PGY-2  Patient d/w Dr. Covington and seen and d/w  Dr. Aguilar

## 2023-10-18 NOTE — H&P ADULT - HISTORY OF PRESENT ILLNESS
HPI:  28y  5w3d by LMP 9/10/23 presents with abdominal pain. Pt has been seeing Dr. Benitez from [facility], doing Letrozole and timed intercourse to get pregnant. bHCG from 10/16 was 41. Pt states that she was in her usual state of health until 10:30pm on 10/17 when she developed severe abdominal pain in her RLQ. The pain lasted for 15minutes before abating. Patient then had recurrent episodes of pain, lasting about 15 minutes each but not as severe as the first episode. She took Tylenol at home which seemed to help. She did not have any associated nausea or vomiting. Upon arrival in the ED, pt no longer had pain. She remained without pain for at least 4 hours until time of this evaluation when the pain returned. Pt describes the pain as cramping in nature and it radiates from her RLQ down her leg and to her back. She noticed some vaginal spotting when she used the bathroom in the ED. Denies fever, chills, SOB, palpitations, dizziness/lightheadedness, purulent discharge.       Name of GYN Physician: Adria  OBHx:           SAB x1   MAB w D&C  GynHx: h/o PCOS. Denies fibroids, endometriosis, STI's, Abnormal pap smears   PMH: hypothyroidism associated with pregnancy  PSH: D&C x1  Meds: synthroid, PNV, vitD  Allx: NKDA  Social History:  Denies smoking use, drug use, alcohol use.     RADIOLOGY:    < from: US Doppler Pelvis (10.18.23 @ 09:20) >  ACC: [id number] EXAM:  US DPLX PELVIC   ORDERED BY: NATASHA GRIMES     ACC: [id number] EXAM:  US OB TRANSVAGINAL   ORDERED BY: NATASHA GRIMES     PROCEDURE DATE:  10/18/2023          INTERPRETATION:  CLINICAL INFORMATION: 28-year-old woman with clinical   concern for possible ruptured ectopic pregnancy. By dates, patient is 5   weeks 3 days. Beta HCG  39.3    LMP: 09/10/2023    COMPARISON: Pelvic ultrasound performed earlier same day    TECHNIQUE:  Endovaginal and transabdominal pelvic sonogram. Color and Spectral   Doppler was performed.    FINDINGS:  Uterus: 9.8 x 5.0 x 7.1 cm.Tthe uterus is anteverted. There is a small   intramural myoma measuring 1.1 x 1.1 x 0.8 cm.    Endometrium: Thickened, measuring 1.3 cm.. No intrauterine pregnancy is   identified.  There is complex fluid within the endometrial cavity   compatible with blood. No intrauterine gestational sac is identified.   There is a tiny 2 mm cystic  structure within the endometrium,   nonspecific, also noted on  the prior exam.    Right ovary: 3.3 cm x 1.9 cm x 2.4 cm. Within normal limits, containing   multiple small follicles. Normal arterial and venous waveforms.  Left ovary: 3.6 cm x 3.1 cm x 2.0 cm. Within normal limits, containing   small follicles and a corpus luteum measuring 2.0 x 1.7 x 1.8 cm. Normal   arterial and venous waveforms.    Inferior to the right ovary, separate from it and extending into the   posterior cul-de-sac at the midline, there is a complex, heterogeneous    mass measuring 5.8 x 2.1 x 3.1 cm. This demonstrates mixed echogenicity   with hyper echoic and hypoechoic components and containing complex fluid.   There is minimal internal vascularity demonstrated with color and   spectral Doppler. This mass has an elongated appearance and is concerning   for possible hematosalpinx in the setting of tubal ectopic pregnancy. No   gestational sac or tubal ring is identified. On the prior examination,   this structure measured 3.7 x 1.9 x 2.4 cm; therefore, this appears to be   slightly increased in size..    Fluid: Small to moderate complex fluid with low-level echoes within the   pelvis, including within the cul-de-sac and both adnexa, compatible with   blood. This does not appear to be substantially changed compared to prior   ultrasound. However,.a small amount of free fluid is noted within the   right lower quadrant, which was not demonstrated on the  prior exam.. No   free fluid is seen in the right upper quadrant.    IMPRESSION:    1. No intrauterine pregnancy is identified. There is a small amount of   complex fluid compatible with  blood within the endometrial cavity.  2. The ovaries are unremarkable, with a corpus luteum within the left   ovary.  3. Complex, heterogeneous mass within the right adnexa, separate from the   right ovary and extending into the  posterior cul-de-sac measuring 5.8 x   2.1 x 3.1 cm. This has an elongated appearance and is concerning for   possible hematosalpinx in the setting of tubal ectopic pregnancy.   However, no tubal ring or gestationalsac is identified. This appears to   be slightly increased in size compared with the prior ultrasound.  4. Small to moderate complex pelvic free fluid with low-level echoes,   compatible with blood, not substantially changed compared with the prior   ultrasound; however, there is also a small amount of free fluid   visualized within the right lower quadrant, concerning for small volume   hemoperitoneum. No free fluid seen in Morison's pouch..    Gynecologic consultation is advised for further evaluation and management.    Findings were discussed with Dr. Elias  10/18/2023 9:35 AM by Dr. Paige with read back confirmation.    --- End of Report ---            BRIANNA PAIGE MD; Attending Radiologist  This document has been electronically signed. Oct 18 2023  9:48AM    < end of copied text >

## 2023-10-19 LAB
CULTURE RESULTS: SIGNIFICANT CHANGE UP
CULTURE RESULTS: SIGNIFICANT CHANGE UP
SPECIMEN SOURCE: SIGNIFICANT CHANGE UP
SPECIMEN SOURCE: SIGNIFICANT CHANGE UP

## 2023-10-20 ENCOUNTER — APPOINTMENT (OUTPATIENT)
Dept: HUMAN REPRODUCTION | Facility: CLINIC | Age: 28
End: 2023-10-20
Payer: COMMERCIAL

## 2023-10-20 PROCEDURE — 36415 COLL VENOUS BLD VENIPUNCTURE: CPT

## 2023-10-20 PROCEDURE — 99215 OFFICE O/P EST HI 40 MIN: CPT

## 2023-10-22 ENCOUNTER — NON-APPOINTMENT (OUTPATIENT)
Age: 28
End: 2023-10-22

## 2023-10-23 ENCOUNTER — NON-APPOINTMENT (OUTPATIENT)
Age: 28
End: 2023-10-23

## 2023-10-25 ENCOUNTER — APPOINTMENT (OUTPATIENT)
Dept: HUMAN REPRODUCTION | Facility: CLINIC | Age: 28
End: 2023-10-25
Payer: COMMERCIAL

## 2023-10-25 LAB
SURGICAL PATHOLOGY STUDY: SIGNIFICANT CHANGE UP
SURGICAL PATHOLOGY STUDY: SIGNIFICANT CHANGE UP

## 2023-10-25 PROCEDURE — 36415 COLL VENOUS BLD VENIPUNCTURE: CPT

## 2023-10-25 PROCEDURE — 76857 US EXAM PELVIC LIMITED: CPT

## 2023-10-25 PROCEDURE — 99213 OFFICE O/P EST LOW 20 MIN: CPT | Mod: 25

## 2023-10-30 ENCOUNTER — OUTPATIENT (OUTPATIENT)
Dept: OUTPATIENT SERVICES | Facility: HOSPITAL | Age: 28
LOS: 1 days | End: 2023-10-30
Payer: COMMERCIAL

## 2023-10-30 ENCOUNTER — RESULT REVIEW (OUTPATIENT)
Age: 28
End: 2023-10-30

## 2023-10-30 VITALS
OXYGEN SATURATION: 100 % | SYSTOLIC BLOOD PRESSURE: 122 MMHG | HEART RATE: 103 BPM | RESPIRATION RATE: 16 BRPM | TEMPERATURE: 98 F | DIASTOLIC BLOOD PRESSURE: 70 MMHG

## 2023-10-30 VITALS
HEART RATE: 112 BPM | OXYGEN SATURATION: 100 % | SYSTOLIC BLOOD PRESSURE: 121 MMHG | DIASTOLIC BLOOD PRESSURE: 68 MMHG | TEMPERATURE: 98 F | RESPIRATION RATE: 16 BRPM

## 2023-10-30 DIAGNOSIS — N97.9 FEMALE INFERTILITY, UNSPECIFIED: ICD-10-CM

## 2023-10-30 LAB
HCG UR QL: NEGATIVE — SIGNIFICANT CHANGE UP
HCG UR QL: NEGATIVE — SIGNIFICANT CHANGE UP

## 2023-10-30 PROCEDURE — 58340 CATHETER FOR HYSTEROGRAPHY: CPT | Mod: 1L,GC

## 2023-10-30 PROCEDURE — 74740 X-RAY FEMALE GENITAL TRACT: CPT | Mod: 26,1L,GC

## 2023-10-31 ENCOUNTER — APPOINTMENT (OUTPATIENT)
Dept: OBGYN | Facility: CLINIC | Age: 28
End: 2023-10-31
Payer: COMMERCIAL

## 2023-10-31 PROCEDURE — 99212 OFFICE O/P EST SF 10 MIN: CPT | Mod: 95

## 2023-11-02 ENCOUNTER — APPOINTMENT (OUTPATIENT)
Dept: HUMAN REPRODUCTION | Facility: CLINIC | Age: 28
End: 2023-11-02
Payer: COMMERCIAL

## 2023-11-02 PROCEDURE — 76857 US EXAM PELVIC LIMITED: CPT

## 2023-11-02 PROCEDURE — 99213 OFFICE O/P EST LOW 20 MIN: CPT | Mod: 25

## 2023-11-02 PROCEDURE — 36415 COLL VENOUS BLD VENIPUNCTURE: CPT

## 2023-11-03 ENCOUNTER — APPOINTMENT (OUTPATIENT)
Dept: OBGYN | Facility: CLINIC | Age: 28
End: 2023-11-03
Payer: COMMERCIAL

## 2023-11-03 VITALS
OXYGEN SATURATION: 99 % | HEART RATE: 89 BPM | BODY MASS INDEX: 23.37 KG/M2 | SYSTOLIC BLOOD PRESSURE: 116 MMHG | DIASTOLIC BLOOD PRESSURE: 73 MMHG | HEIGHT: 62 IN | WEIGHT: 127 LBS

## 2023-11-03 PROCEDURE — 99213 OFFICE O/P EST LOW 20 MIN: CPT

## 2023-11-03 PROCEDURE — 99203 OFFICE O/P NEW LOW 30 MIN: CPT

## 2023-11-08 DIAGNOSIS — Z90.79 ACQUIRED ABSENCE OF OTHER GENITAL ORGAN(S): ICD-10-CM

## 2023-11-08 DIAGNOSIS — N97.9 FEMALE INFERTILITY, UNSPECIFIED: ICD-10-CM

## 2023-11-09 ENCOUNTER — APPOINTMENT (OUTPATIENT)
Dept: HUMAN REPRODUCTION | Facility: CLINIC | Age: 28
End: 2023-11-09

## 2023-11-20 ENCOUNTER — APPOINTMENT (OUTPATIENT)
Dept: HUMAN REPRODUCTION | Facility: CLINIC | Age: 28
End: 2023-11-20
Payer: COMMERCIAL

## 2023-11-20 PROCEDURE — 99213 OFFICE O/P EST LOW 20 MIN: CPT | Mod: 25

## 2023-11-20 PROCEDURE — 76830 TRANSVAGINAL US NON-OB: CPT

## 2023-11-20 PROCEDURE — 36415 COLL VENOUS BLD VENIPUNCTURE: CPT

## 2023-11-20 PROCEDURE — S4042: CPT

## 2023-11-30 ENCOUNTER — APPOINTMENT (OUTPATIENT)
Dept: HUMAN REPRODUCTION | Facility: CLINIC | Age: 28
End: 2023-11-30
Payer: COMMERCIAL

## 2023-11-30 PROCEDURE — 36415 COLL VENOUS BLD VENIPUNCTURE: CPT

## 2023-11-30 PROCEDURE — 76857 US EXAM PELVIC LIMITED: CPT

## 2023-12-03 ENCOUNTER — APPOINTMENT (OUTPATIENT)
Dept: HUMAN REPRODUCTION | Facility: CLINIC | Age: 28
End: 2023-12-03
Payer: COMMERCIAL

## 2023-12-03 PROCEDURE — 99213 OFFICE O/P EST LOW 20 MIN: CPT | Mod: 25

## 2023-12-03 PROCEDURE — 36415 COLL VENOUS BLD VENIPUNCTURE: CPT

## 2023-12-03 PROCEDURE — 76857 US EXAM PELVIC LIMITED: CPT

## 2024-01-02 ENCOUNTER — TRANSCRIPTION ENCOUNTER (OUTPATIENT)
Age: 29
End: 2024-01-02

## 2024-01-03 ENCOUNTER — TRANSCRIPTION ENCOUNTER (OUTPATIENT)
Age: 29
End: 2024-01-03

## 2024-01-03 ENCOUNTER — INPATIENT (INPATIENT)
Facility: HOSPITAL | Age: 29
LOS: 0 days | Discharge: HOME | DRG: 819 | End: 2024-01-03
Attending: OBSTETRICS & GYNECOLOGY | Admitting: OBSTETRICS & GYNECOLOGY
Payer: COMMERCIAL

## 2024-01-03 ENCOUNTER — RESULT REVIEW (OUTPATIENT)
Age: 29
End: 2024-01-03

## 2024-01-03 ENCOUNTER — APPOINTMENT (OUTPATIENT)
Dept: HUMAN REPRODUCTION | Facility: CLINIC | Age: 29
End: 2024-01-03
Payer: COMMERCIAL

## 2024-01-03 ENCOUNTER — OUTPATIENT (OUTPATIENT)
Dept: OUTPATIENT SERVICES | Facility: HOSPITAL | Age: 29
LOS: 1 days | End: 2024-01-03
Payer: COMMERCIAL

## 2024-01-03 ENCOUNTER — APPOINTMENT (OUTPATIENT)
Dept: ULTRASOUND IMAGING | Facility: IMAGING CENTER | Age: 29
End: 2024-01-03
Payer: COMMERCIAL

## 2024-01-03 VITALS
RESPIRATION RATE: 16 BRPM | HEART RATE: 99 BPM | TEMPERATURE: 97 F | DIASTOLIC BLOOD PRESSURE: 64 MMHG | SYSTOLIC BLOOD PRESSURE: 107 MMHG | OXYGEN SATURATION: 100 %

## 2024-01-03 VITALS
OXYGEN SATURATION: 98 % | HEART RATE: 118 BPM | DIASTOLIC BLOOD PRESSURE: 71 MMHG | TEMPERATURE: 98 F | SYSTOLIC BLOOD PRESSURE: 113 MMHG | RESPIRATION RATE: 16 BRPM | WEIGHT: 130.07 LBS | HEIGHT: 62 IN

## 2024-01-03 DIAGNOSIS — Z90.79 ACQUIRED ABSENCE OF OTHER GENITAL ORGAN(S): Chronic | ICD-10-CM

## 2024-01-03 DIAGNOSIS — Z00.8 ENCOUNTER FOR OTHER GENERAL EXAMINATION: ICD-10-CM

## 2024-01-03 DIAGNOSIS — O00.90 UNSPECIFIED ECTOPIC PREGNANCY WITHOUT INTRAUTERINE PREGNANCY: ICD-10-CM

## 2024-01-03 LAB
ALBUMIN SERPL ELPH-MCNC: 4.5 G/DL — SIGNIFICANT CHANGE UP (ref 3.3–5)
ALBUMIN SERPL ELPH-MCNC: 4.5 G/DL — SIGNIFICANT CHANGE UP (ref 3.3–5)
ALP SERPL-CCNC: 65 U/L — SIGNIFICANT CHANGE UP (ref 40–120)
ALP SERPL-CCNC: 65 U/L — SIGNIFICANT CHANGE UP (ref 40–120)
ALT FLD-CCNC: 11 U/L — SIGNIFICANT CHANGE UP (ref 10–45)
ALT FLD-CCNC: 11 U/L — SIGNIFICANT CHANGE UP (ref 10–45)
ANION GAP SERPL CALC-SCNC: 13 MMOL/L — SIGNIFICANT CHANGE UP (ref 5–17)
ANION GAP SERPL CALC-SCNC: 13 MMOL/L — SIGNIFICANT CHANGE UP (ref 5–17)
APPEARANCE UR: CLEAR — SIGNIFICANT CHANGE UP
APPEARANCE UR: CLEAR — SIGNIFICANT CHANGE UP
APTT BLD: 33.3 SEC — SIGNIFICANT CHANGE UP (ref 24.5–35.6)
APTT BLD: 33.3 SEC — SIGNIFICANT CHANGE UP (ref 24.5–35.6)
AST SERPL-CCNC: 13 U/L — SIGNIFICANT CHANGE UP (ref 10–40)
AST SERPL-CCNC: 13 U/L — SIGNIFICANT CHANGE UP (ref 10–40)
BACTERIA # UR AUTO: NEGATIVE /HPF — SIGNIFICANT CHANGE UP
BACTERIA # UR AUTO: NEGATIVE /HPF — SIGNIFICANT CHANGE UP
BASOPHILS # BLD AUTO: 0.02 K/UL — SIGNIFICANT CHANGE UP (ref 0–0.2)
BASOPHILS # BLD AUTO: 0.02 K/UL — SIGNIFICANT CHANGE UP (ref 0–0.2)
BASOPHILS NFR BLD AUTO: 0.2 % — SIGNIFICANT CHANGE UP (ref 0–2)
BASOPHILS NFR BLD AUTO: 0.2 % — SIGNIFICANT CHANGE UP (ref 0–2)
BILIRUB SERPL-MCNC: 0.4 MG/DL — SIGNIFICANT CHANGE UP (ref 0.2–1.2)
BILIRUB SERPL-MCNC: 0.4 MG/DL — SIGNIFICANT CHANGE UP (ref 0.2–1.2)
BILIRUB UR-MCNC: NEGATIVE — SIGNIFICANT CHANGE UP
BILIRUB UR-MCNC: NEGATIVE — SIGNIFICANT CHANGE UP
BUN SERPL-MCNC: 6 MG/DL — LOW (ref 7–23)
BUN SERPL-MCNC: 6 MG/DL — LOW (ref 7–23)
CALCIUM SERPL-MCNC: 9.3 MG/DL — SIGNIFICANT CHANGE UP (ref 8.4–10.5)
CALCIUM SERPL-MCNC: 9.3 MG/DL — SIGNIFICANT CHANGE UP (ref 8.4–10.5)
CAST: 0 /LPF — SIGNIFICANT CHANGE UP (ref 0–4)
CAST: 0 /LPF — SIGNIFICANT CHANGE UP (ref 0–4)
CHLORIDE SERPL-SCNC: 103 MMOL/L — SIGNIFICANT CHANGE UP (ref 96–108)
CHLORIDE SERPL-SCNC: 103 MMOL/L — SIGNIFICANT CHANGE UP (ref 96–108)
CO2 SERPL-SCNC: 23 MMOL/L — SIGNIFICANT CHANGE UP (ref 22–31)
CO2 SERPL-SCNC: 23 MMOL/L — SIGNIFICANT CHANGE UP (ref 22–31)
COLOR SPEC: YELLOW — SIGNIFICANT CHANGE UP
COLOR SPEC: YELLOW — SIGNIFICANT CHANGE UP
CREAT SERPL-MCNC: 0.54 MG/DL — SIGNIFICANT CHANGE UP (ref 0.5–1.3)
CREAT SERPL-MCNC: 0.54 MG/DL — SIGNIFICANT CHANGE UP (ref 0.5–1.3)
DIFF PNL FLD: NEGATIVE — SIGNIFICANT CHANGE UP
DIFF PNL FLD: NEGATIVE — SIGNIFICANT CHANGE UP
EGFR: 129 ML/MIN/1.73M2 — SIGNIFICANT CHANGE UP
EGFR: 129 ML/MIN/1.73M2 — SIGNIFICANT CHANGE UP
EOSINOPHIL # BLD AUTO: 0 K/UL — SIGNIFICANT CHANGE UP (ref 0–0.5)
EOSINOPHIL # BLD AUTO: 0 K/UL — SIGNIFICANT CHANGE UP (ref 0–0.5)
EOSINOPHIL NFR BLD AUTO: 0 % — SIGNIFICANT CHANGE UP (ref 0–6)
EOSINOPHIL NFR BLD AUTO: 0 % — SIGNIFICANT CHANGE UP (ref 0–6)
GLUCOSE SERPL-MCNC: 100 MG/DL — HIGH (ref 70–99)
GLUCOSE SERPL-MCNC: 100 MG/DL — HIGH (ref 70–99)
GLUCOSE UR QL: NEGATIVE MG/DL — SIGNIFICANT CHANGE UP
GLUCOSE UR QL: NEGATIVE MG/DL — SIGNIFICANT CHANGE UP
HCG SERPL-ACNC: 5137 MIU/ML — HIGH
HCG SERPL-ACNC: 5137 MIU/ML — HIGH
HCT VFR BLD CALC: 42.2 % — SIGNIFICANT CHANGE UP (ref 34.5–45)
HCT VFR BLD CALC: 42.2 % — SIGNIFICANT CHANGE UP (ref 34.5–45)
HGB BLD-MCNC: 14.1 G/DL — SIGNIFICANT CHANGE UP (ref 11.5–15.5)
HGB BLD-MCNC: 14.1 G/DL — SIGNIFICANT CHANGE UP (ref 11.5–15.5)
IMM GRANULOCYTES NFR BLD AUTO: 0.5 % — SIGNIFICANT CHANGE UP (ref 0–0.9)
IMM GRANULOCYTES NFR BLD AUTO: 0.5 % — SIGNIFICANT CHANGE UP (ref 0–0.9)
INR BLD: 1.12 RATIO — SIGNIFICANT CHANGE UP (ref 0.85–1.18)
INR BLD: 1.12 RATIO — SIGNIFICANT CHANGE UP (ref 0.85–1.18)
KETONES UR-MCNC: 15 MG/DL
KETONES UR-MCNC: 15 MG/DL
LEUKOCYTE ESTERASE UR-ACNC: ABNORMAL
LEUKOCYTE ESTERASE UR-ACNC: ABNORMAL
LYMPHOCYTES # BLD AUTO: 1.3 K/UL — SIGNIFICANT CHANGE UP (ref 1–3.3)
LYMPHOCYTES # BLD AUTO: 1.3 K/UL — SIGNIFICANT CHANGE UP (ref 1–3.3)
LYMPHOCYTES # BLD AUTO: 13.7 % — SIGNIFICANT CHANGE UP (ref 13–44)
LYMPHOCYTES # BLD AUTO: 13.7 % — SIGNIFICANT CHANGE UP (ref 13–44)
MCHC RBC-ENTMCNC: 29.7 PG — SIGNIFICANT CHANGE UP (ref 27–34)
MCHC RBC-ENTMCNC: 29.7 PG — SIGNIFICANT CHANGE UP (ref 27–34)
MCHC RBC-ENTMCNC: 33.4 GM/DL — SIGNIFICANT CHANGE UP (ref 32–36)
MCHC RBC-ENTMCNC: 33.4 GM/DL — SIGNIFICANT CHANGE UP (ref 32–36)
MCV RBC AUTO: 89 FL — SIGNIFICANT CHANGE UP (ref 80–100)
MCV RBC AUTO: 89 FL — SIGNIFICANT CHANGE UP (ref 80–100)
MONOCYTES # BLD AUTO: 0.52 K/UL — SIGNIFICANT CHANGE UP (ref 0–0.9)
MONOCYTES # BLD AUTO: 0.52 K/UL — SIGNIFICANT CHANGE UP (ref 0–0.9)
MONOCYTES NFR BLD AUTO: 5.5 % — SIGNIFICANT CHANGE UP (ref 2–14)
MONOCYTES NFR BLD AUTO: 5.5 % — SIGNIFICANT CHANGE UP (ref 2–14)
NEUTROPHILS # BLD AUTO: 7.57 K/UL — HIGH (ref 1.8–7.4)
NEUTROPHILS # BLD AUTO: 7.57 K/UL — HIGH (ref 1.8–7.4)
NEUTROPHILS NFR BLD AUTO: 80.1 % — HIGH (ref 43–77)
NEUTROPHILS NFR BLD AUTO: 80.1 % — HIGH (ref 43–77)
NITRITE UR-MCNC: NEGATIVE — SIGNIFICANT CHANGE UP
NITRITE UR-MCNC: NEGATIVE — SIGNIFICANT CHANGE UP
NRBC # BLD: 0 /100 WBCS — SIGNIFICANT CHANGE UP (ref 0–0)
NRBC # BLD: 0 /100 WBCS — SIGNIFICANT CHANGE UP (ref 0–0)
PH UR: 6.5 — SIGNIFICANT CHANGE UP (ref 5–8)
PH UR: 6.5 — SIGNIFICANT CHANGE UP (ref 5–8)
PLATELET # BLD AUTO: 216 K/UL — SIGNIFICANT CHANGE UP (ref 150–400)
PLATELET # BLD AUTO: 216 K/UL — SIGNIFICANT CHANGE UP (ref 150–400)
POTASSIUM SERPL-MCNC: 3.4 MMOL/L — LOW (ref 3.5–5.3)
POTASSIUM SERPL-MCNC: 3.4 MMOL/L — LOW (ref 3.5–5.3)
POTASSIUM SERPL-SCNC: 3.4 MMOL/L — LOW (ref 3.5–5.3)
POTASSIUM SERPL-SCNC: 3.4 MMOL/L — LOW (ref 3.5–5.3)
PROT SERPL-MCNC: 7.1 G/DL — SIGNIFICANT CHANGE UP (ref 6–8.3)
PROT SERPL-MCNC: 7.1 G/DL — SIGNIFICANT CHANGE UP (ref 6–8.3)
PROT UR-MCNC: NEGATIVE MG/DL — SIGNIFICANT CHANGE UP
PROT UR-MCNC: NEGATIVE MG/DL — SIGNIFICANT CHANGE UP
PROTHROM AB SERPL-ACNC: 12.3 SEC — SIGNIFICANT CHANGE UP (ref 9.5–13)
PROTHROM AB SERPL-ACNC: 12.3 SEC — SIGNIFICANT CHANGE UP (ref 9.5–13)
RBC # BLD: 4.74 M/UL — SIGNIFICANT CHANGE UP (ref 3.8–5.2)
RBC # BLD: 4.74 M/UL — SIGNIFICANT CHANGE UP (ref 3.8–5.2)
RBC # FLD: 12.8 % — SIGNIFICANT CHANGE UP (ref 10.3–14.5)
RBC # FLD: 12.8 % — SIGNIFICANT CHANGE UP (ref 10.3–14.5)
RBC CASTS # UR COMP ASSIST: 1 /HPF — SIGNIFICANT CHANGE UP (ref 0–4)
RBC CASTS # UR COMP ASSIST: 1 /HPF — SIGNIFICANT CHANGE UP (ref 0–4)
SODIUM SERPL-SCNC: 139 MMOL/L — SIGNIFICANT CHANGE UP (ref 135–145)
SODIUM SERPL-SCNC: 139 MMOL/L — SIGNIFICANT CHANGE UP (ref 135–145)
SP GR SPEC: 1.01 — SIGNIFICANT CHANGE UP (ref 1–1.03)
SP GR SPEC: 1.01 — SIGNIFICANT CHANGE UP (ref 1–1.03)
SQUAMOUS # UR AUTO: 6 /HPF — HIGH (ref 0–5)
SQUAMOUS # UR AUTO: 6 /HPF — HIGH (ref 0–5)
UROBILINOGEN FLD QL: 0.2 MG/DL — SIGNIFICANT CHANGE UP (ref 0.2–1)
UROBILINOGEN FLD QL: 0.2 MG/DL — SIGNIFICANT CHANGE UP (ref 0.2–1)
WBC # BLD: 9.46 K/UL — SIGNIFICANT CHANGE UP (ref 3.8–10.5)
WBC # BLD: 9.46 K/UL — SIGNIFICANT CHANGE UP (ref 3.8–10.5)
WBC # FLD AUTO: 9.46 K/UL — SIGNIFICANT CHANGE UP (ref 3.8–10.5)
WBC # FLD AUTO: 9.46 K/UL — SIGNIFICANT CHANGE UP (ref 3.8–10.5)
WBC UR QL: 1 /HPF — SIGNIFICANT CHANGE UP (ref 0–5)
WBC UR QL: 1 /HPF — SIGNIFICANT CHANGE UP (ref 0–5)

## 2024-01-03 PROCEDURE — 36415 COLL VENOUS BLD VENIPUNCTURE: CPT

## 2024-01-03 PROCEDURE — 99214 OFFICE O/P EST MOD 30 MIN: CPT | Mod: 25

## 2024-01-03 PROCEDURE — 87086 URINE CULTURE/COLONY COUNT: CPT

## 2024-01-03 PROCEDURE — 99285 EMERGENCY DEPT VISIT HI MDM: CPT

## 2024-01-03 PROCEDURE — 76817 TRANSVAGINAL US OBSTETRIC: CPT

## 2024-01-03 PROCEDURE — 85610 PROTHROMBIN TIME: CPT

## 2024-01-03 PROCEDURE — 86850 RBC ANTIBODY SCREEN: CPT

## 2024-01-03 PROCEDURE — 86900 BLOOD TYPING SEROLOGIC ABO: CPT

## 2024-01-03 PROCEDURE — 81001 URINALYSIS AUTO W/SCOPE: CPT

## 2024-01-03 PROCEDURE — 76830 TRANSVAGINAL US NON-OB: CPT

## 2024-01-03 PROCEDURE — 84702 CHORIONIC GONADOTROPIN TEST: CPT

## 2024-01-03 PROCEDURE — C1765: CPT

## 2024-01-03 PROCEDURE — 88305 TISSUE EXAM BY PATHOLOGIST: CPT | Mod: 26

## 2024-01-03 PROCEDURE — 80053 COMPREHEN METABOLIC PANEL: CPT

## 2024-01-03 PROCEDURE — 85730 THROMBOPLASTIN TIME PARTIAL: CPT

## 2024-01-03 PROCEDURE — 76830 TRANSVAGINAL US NON-OB: CPT | Mod: 26

## 2024-01-03 PROCEDURE — 86901 BLOOD TYPING SEROLOGIC RH(D): CPT

## 2024-01-03 PROCEDURE — 76856 US EXAM PELVIC COMPLETE: CPT

## 2024-01-03 PROCEDURE — C9399: CPT

## 2024-01-03 PROCEDURE — 85025 COMPLETE CBC W/AUTO DIFF WBC: CPT

## 2024-01-03 PROCEDURE — 88305 TISSUE EXAM BY PATHOLOGIST: CPT

## 2024-01-03 PROCEDURE — 76856 US EXAM PELVIC COMPLETE: CPT | Mod: 26,59

## 2024-01-03 PROCEDURE — 59151 TREAT ECTOPIC PREGNANCY: CPT | Mod: 79

## 2024-01-03 DEVICE — INTERCEED 3 X 4": Type: IMPLANTABLE DEVICE | Status: FUNCTIONAL

## 2024-01-03 RX ORDER — ONDANSETRON 8 MG/1
4 TABLET, FILM COATED ORAL ONCE
Refills: 0 | Status: DISCONTINUED | OUTPATIENT
Start: 2024-01-03 | End: 2024-01-03

## 2024-01-03 RX ORDER — HYDROMORPHONE HYDROCHLORIDE 2 MG/ML
0.5 INJECTION INTRAMUSCULAR; INTRAVENOUS; SUBCUTANEOUS
Refills: 0 | Status: DISCONTINUED | OUTPATIENT
Start: 2024-01-03 | End: 2024-01-03

## 2024-01-03 RX ORDER — OXYCODONE HYDROCHLORIDE 5 MG/1
1 TABLET ORAL
Qty: 5 | Refills: 0
Start: 2024-01-03

## 2024-01-03 RX ORDER — SODIUM CHLORIDE 9 MG/ML
1000 INJECTION INTRAMUSCULAR; INTRAVENOUS; SUBCUTANEOUS ONCE
Refills: 0 | Status: COMPLETED | OUTPATIENT
Start: 2024-01-03 | End: 2024-01-03

## 2024-01-03 RX ORDER — OXYCODONE HYDROCHLORIDE 5 MG/1
5 TABLET ORAL ONCE
Refills: 0 | Status: DISCONTINUED | OUTPATIENT
Start: 2024-01-03 | End: 2024-01-03

## 2024-01-03 RX ORDER — SODIUM CHLORIDE 9 MG/ML
1000 INJECTION, SOLUTION INTRAVENOUS
Refills: 0 | Status: DISCONTINUED | OUTPATIENT
Start: 2024-01-03 | End: 2024-01-03

## 2024-01-03 RX ORDER — ACETAMINOPHEN 500 MG
975 TABLET ORAL ONCE
Refills: 0 | Status: COMPLETED | OUTPATIENT
Start: 2024-01-03 | End: 2024-01-03

## 2024-01-03 RX ADMIN — HYDROMORPHONE HYDROCHLORIDE 0.5 MILLIGRAM(S): 2 INJECTION INTRAMUSCULAR; INTRAVENOUS; SUBCUTANEOUS at 19:35

## 2024-01-03 RX ADMIN — Medication 975 MILLIGRAM(S): at 22:16

## 2024-01-03 RX ADMIN — HYDROMORPHONE HYDROCHLORIDE 0.5 MILLIGRAM(S): 2 INJECTION INTRAMUSCULAR; INTRAVENOUS; SUBCUTANEOUS at 19:23

## 2024-01-03 RX ADMIN — SODIUM CHLORIDE 125 MILLILITER(S): 9 INJECTION, SOLUTION INTRAVENOUS at 21:26

## 2024-01-03 RX ADMIN — SODIUM CHLORIDE 1000 MILLILITER(S): 9 INJECTION INTRAMUSCULAR; INTRAVENOUS; SUBCUTANEOUS at 13:54

## 2024-01-03 RX ADMIN — Medication 975 MILLIGRAM(S): at 22:45

## 2024-01-03 NOTE — CHART NOTE - NSCHARTNOTEFT_GEN_A_CORE
R2 OBGYN POST-OP CHECK    S: Patient seen and evaluated at bedside.  Pt awake and resting in bed, still feels tired.  Reports pain is improving with analgesia.  Tolerated clears.  Endorsed mild nausea without vomiting.  Feels urge to urinate.  Denies SOB, CP, fever/chills, lightheadedness, dizziness. Not OOB yet.    O:   T(C): 36.4 (01-03-24 @ 18:50), Max: 36.4 (01-03-24 @ 18:50)  HR: 100 (01-03-24 @ 20:30) (99 - 109)  BP: 102/60 (01-03-24 @ 20:30) (94/55 - 106/55)  RR: 16 (01-03-24 @ 20:30) (16 - 18)  SpO2: 98% (01-03-24 @ 20:30) (98% - 100%)  Wt(kg): --  I&O's Summary    03 Jan 2024 07:01  -  03 Jan 2024 20:57  --------------------------------------------------------  IN: 250 mL / OUT: 0 mL / NET: 250 mL        Gen: Resting comfortably in bed, NAD  CV: S1S2, RRR  Lungs: CTA B/L  Abd: soft, appropriately tender, occasional BS x 4 quadrants.    : no bleeding on pad  Inc: lsc port sites c/d/i covered with dermabond.  Ext: SCD's in place and functional, non-tender b/l, no edema        A/P: 28y Female s/p lsc excision of R cornual ectopic pregnancy.  Patient progressing appropriately postoperatively    Neuro: Dilaudid and Oxycodone PRN  CV: Hemodynamically stable.  Monitor VS.  Pulm: Saturating well on room air.  Encourage OOB and incentive spirometer use.   GI: Advance to regular diet. Anti-emetics PRN.  : DTV by 2am  FEN: Electrolytes: LR@125cc/hr  Heme: DVT ppx w/ SCD's while in bed. Early ambulation, initially with assistance then as tolerated.   ID: Afebrile  Endo: No active issues   Dispo: Discharge from PACU when criteria met.     Discussed with Dr. Lima, Dr. Frankel, and Dr. Farzana Parks, PGY2

## 2024-01-03 NOTE — PRE-ANESTHESIA EVALUATION ADULT - NSANTHPMHFT_GEN_ALL_CORE
previous anesthetic reviewed  possible allergy to ancef- had rash after last procedure, was told most likely due to antibiotic

## 2024-01-03 NOTE — ED PROVIDER NOTE - ATTENDING APP SHARED VISIT CONTRIBUTION OF CARE
Dr. Washington: I performed a face to face bedside interview with patient regarding history of present illness, review of symptoms and past medical history. I completed an independent physical exam.  I have discussed patient's plan of care with PA.   I agree with note as stated above, having amended the EMR as needed to reflect my findings.   This includes HISTORY OF PRESENT ILLNESS, HIV, PAST MEDICAL/SURGICAL/FAMILY/SOCIAL HISTORY, ALLERGIES AND HOME MEDICATIONS, REVIEW OF SYSTEMS, PHYSICAL EXAM, and any PROGRESS NOTES during the time I functioned as the attending physician for this patient.    see mdm

## 2024-01-03 NOTE — ASU DISCHARGE PLAN (ADULT/PEDIATRIC) - PROVIDER TOKENS
PROVIDER:[TOKEN:[[id number]]],PROVIDER:[TOKEN:[[id number]]] PROVIDER:[TOKEN:[[id number]]],PROVIDER:[TOKEN:[[id number]]]

## 2024-01-03 NOTE — ED CLERICAL - NS ED CLERK NOTE PRE-ARRIVAL INFORMATION; ADDITIONAL PRE-ARRIVAL INFORMATION
CC/Reason For referral: Possible Ectopic pregnancy  Preferred Consultant(if applicable): OB-GYN  Who admits for you (if needed): OB-GYN  Do you have documents you would like to fax over? No  Would you still like to speak to an ED attending? Yes can call [facility] [phone] CC/Reason For referral: Possible Ectopic pregnancy  Preferred Consultant(if applicable): OB-GYN  Who admits for you (if needed): OB-GYN  Do you have documents you would like to fax over? No  Would you still like to speak to an ED attending? Yes can call [facility] [phone]

## 2024-01-03 NOTE — ED ADULT NURSE NOTE - NSICDXPASTMEDICALHX_GEN_ALL_CORE_FT
PAST MEDICAL HISTORY:  PCOS (polycystic ovarian syndrome)     PCOS (polycystic ovarian syndrome)      PAST MEDICAL HISTORY:  Ectopic pregnancy     PCOS (polycystic ovarian syndrome)     PCOS (polycystic ovarian syndrome)

## 2024-01-03 NOTE — ASU DISCHARGE PLAN (ADULT/PEDIATRIC) - CARE PROVIDER_API CALL
Jimi Yanes  Obstetrics and Gynecology  [address], [address]  [place] [address]  Phone: [phone]  Fax: [phone]  Follow Up Time:     Sofía Benitez  Reproductive Endo/Infertility  [address], [place] [address]  Phone: [phone]  Fax: [phone]  Follow Up Time:    Jimi Yanes  Obstetrics and Gynecology  [address], [address]  [place] [address]  Phone: [phone]  Fax: [phone]  Follow Up Time:     Sofía Benitez  Reproductive Endo/Infertility  [address], [place] [address]  Phone: [phone]  Fax: [phone]  Follow Up Time:

## 2024-01-03 NOTE — ED ADULT TRIAGE NOTE - CHIEF COMPLAINT QUOTE
right sided abd pain starting this morning- is 6-7 weeks pregnant- confirmed with outpatient US of positive ectopic- 2 months ago had another ectopic on same side.

## 2024-01-03 NOTE — H&P ADULT - NSHPLABSRESULTS_GEN_ALL_CORE
LABS:                          14.1   9.46  )-----------( 216      ( 03 Jan 2024 13:58 )             42.2     01-03    139  |  103  |  6<L>  ----------------------------<  100<H>  3.4<L>   |  23  |  0.54    Ca    9.3      03 Jan 2024 13:58    TPro  7.1  /  Alb  4.5  /  TBili  0.4  /  DBili  x   /  AST  13  /  ALT  11  /  AlkPhos  65  01-03    LIVER FUNCTIONS - ( 03 Jan 2024 13:58 )  Alb: 4.5 g/dL / Pro: 7.1 g/dL / ALK PHOS: 65 U/L / ALT: 11 U/L / AST: 13 U/L / GGT: x           PT/INR - ( 03 Jan 2024 13:58 )   PT: 12.3 sec;   INR: 1.12 ratio         PTT - ( 03 Jan 2024 13:58 )  PTT:33.3 sec  Urinalysis Basic - ( 03 Jan 2024 13:58 )    Color: x / Appearance: x / SG: x / pH: x  Gluc: 100 mg/dL / Ketone: x  / Bili: x / Urobili: x   Blood: x / Protein: x / Nitrite: x   Leuk Esterase: x / RBC: x / WBC x   Sq Epi: x / Non Sq Epi: x / Bacteria: x

## 2024-01-03 NOTE — H&P ADULT - HISTORY OF PRESENT ILLNESS
MUAREEN SOLIMAN  28y  Female [id number]    HPI: 27yo  LMP unknown presents with concern for R cornual vs stump ectopic at the site of her prior salpingectomy for R tubal ectopic in Oct 2023. Patient states she had a sharp R sided abdominal pain that originated at her periumbilical region this AM, which she states felt like her prior ectopic. She took a Tylenol soon after and went to get evaluated by her [facility] provider, Dr Benitez, who scanned her and sent her in for management of ectopic pregnancy. She currently is in 0/10 pain, Patient denies chest pain, shortness of breath, fevers, chills, nausea, vomiting, diarrhea.           Name of GYN Physician:     POB:  FT NSVDx2, SABx1, MABx1 s/p D&C, R ectopic x1 s/p LSC R salpingectomy in Oct 2023    Pgyn: Denies fibroids, cysts, endometriosis, STI's, Abnormal pap smears     Allergies      PAST MEDICAL & SURGICAL HISTORY:  PCOS (polycystic ovarian syndrome)  h/o R ectopic pregnancy    FAMILY HISTORY:  No pertinent family history in first degree relatives        Social History:  Denies smoking use, drug use, alcohol use.   +occasional social alcohol use    Vital Signs Last 24 Hrs  T(C): 36.7 (2024 13:03), Max: 36.7 (2024 13:03)  T(F): 98 (2024 13:03), Max: 98 (2024 13:03)  HR: 118 (2024 13:03) (118 - 118)  BP: 113/71 (2024 13:03) (113/71 - 113/71)  BP(mean): --  RR: 16 (2024 13:03) (16 - 16)  SpO2: 98% (2024 13:03) (98% - 98%)    Parameters below as of 2024 13:03  Patient On (Oxygen Delivery Method): room air            LABS:                              14.1   9.46  )-----------( 216      ( 2024 13:58 )             42.2           I&O's Detail    PT/INR - ( 2024 13:58 )   PT: 12.3 sec;   INR: 1.12 ratio         PTT - ( 2024 13:58 )  PTT:33.3 sec   MAUREEN SOLIMAN  28y  Female [id number]    HPI: 29yo  LMP unknown presents with concern for R cornual vs stump ectopic at the site of her prior salpingectomy for R tubal ectopic in Oct 2023. Patient states she had a sharp R sided abdominal pain that originated at her periumbilical region this AM, which she states felt like her prior ectopic. She took a Tylenol soon after and went to get evaluated by her [facility] provider, Dr Benitez, who scanned her and sent her in for management of ectopic pregnancy. She currently is in 0/10 pain, Patient denies chest pain, shortness of breath, fevers, chills, nausea, vomiting, diarrhea.           Name of GYN Physician:     POB:  FT NSVDx2, SABx1, MABx1 s/p D&C, R ectopic x1 s/p LSC R salpingectomy in Oct 2023    Pgyn: Denies fibroids, cysts, endometriosis, STI's, Abnormal pap smears     Allergies      PAST MEDICAL & SURGICAL HISTORY:  PCOS (polycystic ovarian syndrome)  h/o R ectopic pregnancy    FAMILY HISTORY:  No pertinent family history in first degree relatives        Social History:  Denies smoking use, drug use, alcohol use.   +occasional social alcohol use    Vital Signs Last 24 Hrs  T(C): 36.7 (2024 13:03), Max: 36.7 (2024 13:03)  T(F): 98 (2024 13:03), Max: 98 (2024 13:03)  HR: 118 (2024 13:03) (118 - 118)  BP: 113/71 (2024 13:03) (113/71 - 113/71)  BP(mean): --  RR: 16 (2024 13:03) (16 - 16)  SpO2: 98% (2024 13:03) (98% - 98%)    Parameters below as of 2024 13:03  Patient On (Oxygen Delivery Method): room air            LABS:                              14.1   9.46  )-----------( 216      ( 2024 13:58 )             42.2           I&O's Detail    PT/INR - ( 2024 13:58 )   PT: 12.3 sec;   INR: 1.12 ratio         PTT - ( 2024 13:58 )  PTT:33.3 sec

## 2024-01-03 NOTE — H&P ADULT - ASSESSMENT
29yo  LMP unknown presents with concern for R cornual vs stump ectopic at the site of her prior salpingectomy, VSS and patient currently stable, however location of ectopic necessitates emergent surgical intervention:    - Admit to GYN  - Type ans screen x2  - NPO, last meal at 730a  - Add on to OR for emergent diagnostic laparoscopy, possible unilateral salpingectomy, removal of ectopic pregnancy, possible suction d&c    Amyeo Afroz Jereen, PGY-3  d/w Dr Yanes  27yo  LMP unknown presents with concern for R cornual vs stump ectopic at the site of her prior salpingectomy, VSS and patient currently stable, however location of ectopic necessitates emergent surgical intervention:    - Admit to GYN  - Type ans screen x2  - NPO, last meal at 730a  - Add on to OR for emergent diagnostic laparoscopy, possible unilateral salpingectomy, removal of ectopic pregnancy, possible suction d&c    Amyeo Afroz Jereen, PGY-3  d/w Dr Yanes

## 2024-01-03 NOTE — ED PROVIDER NOTE - OBJECTIVE STATEMENT
29 y/o female currently 6 weeks pregnant  recent ectopic requiring right sided salpingectomy now directed to the ED by OB/GYN for confirmed ectopic on outpatient ultrasound. Patient reported acute onset of right lower quadrant pain at 7am this morning while getting ready for work without cramping or vaginal bleeding. Took Tylenol with improvement of pain. Patient denied CP, SOB, N/V/D, fever, syncope, palpitations 27 y/o female currently 6 weeks pregnant  recent ectopic requiring right sided salpingectomy now directed to the ED by OB/GYN for confirmed ectopic on outpatient ultrasound. Patient reported acute onset of right lower quadrant pain at 7am this morning while getting ready for work without cramping or vaginal bleeding. Took Tylenol with improvement of pain. Patient denied CP, SOB, N/V/D, fever, syncope, palpitations

## 2024-01-03 NOTE — ED ADULT NURSE NOTE - OBJECTIVE STATEMENT
Encounter addended by: Teresa Joseph, PT on: 2/28/2022 9:36 AM   Actions taken: Flowsheet accepted 29 yo F pt 6 wks pregnant  with recent ectopic xs 2 months ago with R salpingectomy told to come to ED by OBGYN for confirmed ectopic on US. pt c/o sudden onset RLQ pain this morning. pt tried Tylenol with relief.  pt is A&Ox4, MAEW, skin warm dry intact/normal for race.  Pt denies headache, dizziness, chest pain, palpitations, cough, SOB, n/v/d, urinary symptoms, fevers, chills, weakness at this time. 27 yo F pt 6 wks pregnant  with recent ectopic xs 2 months ago with R salpingectomy told to come to ED by OBGYN for confirmed ectopic on US. pt c/o sudden onset RLQ pain this morning. pt tried Tylenol with relief.  pt is A&Ox4, MAEW, skin warm dry intact/normal for race.  Pt denies headache, dizziness, chest pain, palpitations, cough, SOB, n/v/d, urinary symptoms, fevers, chills, weakness at this time.

## 2024-01-03 NOTE — ED ADULT NURSE NOTE - NSFALLUNIVINTERV_ED_ALL_ED
Bed/Stretcher in lowest position, wheels locked, appropriate side rails in place/Call bell, personal items and telephone in reach/Instruct patient to call for assistance before getting out of bed/chair/stretcher/Non-slip footwear applied when patient is off stretcher/[place] to call system/Physically safe environment - no spills, clutter or unnecessary equipment/Purposeful proactive rounding/Room/bathroom lighting operational, light cord in reach Bed/Stretcher in lowest position, wheels locked, appropriate side rails in place/Call bell, personal items and telephone in reach/Instruct patient to call for assistance before getting out of bed/chair/stretcher/Non-slip footwear applied when patient is off stretcher/[place] to call system/Physically safe environment - no spills, clutter or unnecessary equipment/Purposeful proactive rounding/Room/bathroom lighting operational, light cord in reach

## 2024-01-03 NOTE — BRIEF OPERATIVE NOTE - OPERATION/FINDINGS
Ectopic pregnancy in right cornua/stump of right fallopian tube. Left fallopian tube, uterus, and bilateral ovaries grossly normal.

## 2024-01-03 NOTE — BRIEF OPERATIVE NOTE - NSICDXBRIEFPROCEDURE_GEN_ALL_CORE_FT
PROCEDURES:  Laparoscopy with surgical removal of right sided ectopic pregnancy 03-Jan-2024 19:20:53 excision of right cornual ectopic pregnancy Vida Padgett

## 2024-01-03 NOTE — ASU DISCHARGE PLAN (ADULT/PEDIATRIC) - NS MD DC FALL RISK RISK
For information on Fall & Injury Prevention, visit: [email or web address] OR  [email or web address] OR  https://www.cdc.gov/steadi/patient.html For information on Fall & Injury Prevention, visit: [email or web address] OR  [email or web address] OR  https://www.cdc.gov/steadi/patient.html

## 2024-01-03 NOTE — ED ADULT TRIAGE NOTE - PRO INTERPRETER NEED 2
"   10/04/21 [id number]   Child Life   Location Med/Surg  (constipation)   Intervention Referral/Consult;Initial Assessment;Preparation;Procedure Support   Preparation Comment Referral to support patient for rectal manometry at bedside. Created a coping plan with GI nurses and mother. Coping plan included: laying on side in bed facing mother, mother and this writer being the \"one voice\", oral anxiolytic, simple explanations of steps/sensationsin the moment and distraction with ipad/squish ball/light up wand.   Procedure Support Comment Patient coped well with implementation of coping plan. Mother was present and supportive. Mother gave honest explanations of steps and was encouraging/comforting to patient.   Anxiety Appropriate;Low Anxiety   Techniques to [place] with Loss/Stress/Change diversional activity;family presence;medication   Special Interests paw patrol   Outcomes/Follow Up Continue to Follow/Support     " English

## 2024-01-03 NOTE — ED PROVIDER NOTE - CLINICAL SUMMARY MEDICAL DECISION MAKING FREE TEXT BOX
Dr. Washington: 28-year-old female G6, P2 sent in from OB/GYN office for ectopic pregnancy diagnosed on ultrasound this morning.  Patient is 6 weeks pregnant, reported acute onset right lower quadrant pain at 7 AM, no vaginal bleeding, went to her OB/GYN and had an ultrasound done which was shows 1.2 cm right adnexal thick-walled mass between the uterus and abdominal wall suggestive of ectopic pregnancy.    Gen: No acute distress  HEENT: Mucous membranes moist, pink conjunctivae, EOMI  CV: RRR, no clubbing/cyanosis/edema  Resp: CTAB  GI: Abdomen soft, NT, ND. Normal BS. No rebound, no guarding  : No CVAT  Neuro: A&O x 3, moving all 4 extremities  MSK: No spine or joint tenderness to palpation  Skin: No rashes    Patient seen at the bedside with  and OB/GYN resident.  Plan is to take patient to the OR for the ectopic pregnancy.  Patient is currently pain-free.

## 2024-01-03 NOTE — ED PROVIDER NOTE - NSICDXPASTMEDICALHX_GEN_ALL_CORE_FT
PAST MEDICAL HISTORY:  PCOS (polycystic ovarian syndrome)     PCOS (polycystic ovarian syndrome)

## 2024-01-03 NOTE — PRE-ANESTHESIA EVALUATION ADULT - NSANTHOSAYNRD_GEN_A_CORE
No. ISABELLA screening performed.  STOP BANG Legend: 0-2 = LOW Risk; 3-4 = INTERMEDIATE Risk; 5-8 = HIGH Risk No. IASBELLA screening performed.  STOP BANG Legend: 0-2 = LOW Risk; 3-4 = INTERMEDIATE Risk; 5-8 = HIGH Risk

## 2024-01-03 NOTE — BRIEF OPERATIVE NOTE - COMMENTS
Dictation #[id number] Dictation #[id number] Dictation #[id number]  Interceed placed over uterus Dictation #[id number]  Interceed placed over uterus

## 2024-01-03 NOTE — ASU DISCHARGE PLAN (ADULT/PEDIATRIC) - FOLLOW UP APPOINTMENTS
[facility], [facility], [facility]

## 2024-01-03 NOTE — ASU DISCHARGE PLAN (ADULT/PEDIATRIC) - ASU DC SPECIAL INSTRUCTIONSFT
Postoperative Instructions      Pain control     For pain control, take the followin. Motrin 600mg four times a day, take with food  2. Add Tylenol 975 four times a day, alternated with motrin  3. Add oxycodone as needed for severe pain not managed well by motrin and tylenol. A prescription has been sent to your pharmacy on file.     Motrin and Tylenol can be obtained over the counter.    Postoperative restrictions   Do not drive or make important decisions for 24 hours after anesthesia. You may feel drowsy for 24 hours and should have a responsible adult with you during that time. Nothing in the vagina (tampons, sexual intercourse), No tub baths, pools or hot tubs for 6 weeks (showers are ok!). No lifting anything heavier than 15 lbs, no strenuous exercise for 6 weeks after surgery. Do not pull or cut any stitches that you see around your incision.         Vaginal bleeding   Spotting per vagina is normal in first few days after surgery. If you are soaking 1 pad per hour, that is not normal and you should notify your doctor's office and seek medical attention right away.        Signs of Infection  Some postoperative pain and discomfort is normal. However, if you experience any of the following, you may be developing an infection and should be seen by your doctor: pain that does not get better with the oral medications listed above, fever greater than 100.4F, foul smelling discharge coming from the surgical site, nausea and vomiting that does not stop (especially if you are unable to tolerate oral intake), or inability to urinate. If you experience any of these symptoms, call your doctor's office to be seen right away.    Follow Up  Call Dr. Yanes's office to schedule a postoperative appointment in 2 weeks. The results from the procedure will be discussed with you at that time.  Call Dr. Benitez's office to schedule an appointment in one week.

## 2024-01-04 PROBLEM — O00.90 UNSPECIFIED ECTOPIC PREGNANCY WITHOUT INTRAUTERINE PREGNANCY: Chronic | Status: ACTIVE | Noted: 2024-01-03

## 2024-01-04 LAB
CULTURE RESULTS: ABNORMAL
CULTURE RESULTS: ABNORMAL
SPECIMEN SOURCE: SIGNIFICANT CHANGE UP
SPECIMEN SOURCE: SIGNIFICANT CHANGE UP

## 2024-01-08 LAB
SURGICAL PATHOLOGY STUDY: SIGNIFICANT CHANGE UP
SURGICAL PATHOLOGY STUDY: SIGNIFICANT CHANGE UP

## 2024-01-10 ENCOUNTER — APPOINTMENT (OUTPATIENT)
Dept: HUMAN REPRODUCTION | Facility: CLINIC | Age: 29
End: 2024-01-10
Payer: COMMERCIAL

## 2024-01-10 PROCEDURE — 36415 COLL VENOUS BLD VENIPUNCTURE: CPT

## 2024-01-10 PROCEDURE — 99215 OFFICE O/P EST HI 40 MIN: CPT | Mod: 24

## 2024-01-11 ENCOUNTER — APPOINTMENT (OUTPATIENT)
Dept: OBGYN | Facility: CLINIC | Age: 29
End: 2024-01-11

## 2024-01-18 ENCOUNTER — APPOINTMENT (OUTPATIENT)
Dept: OBGYN | Facility: CLINIC | Age: 29
End: 2024-01-18
Payer: COMMERCIAL

## 2024-01-18 VITALS
DIASTOLIC BLOOD PRESSURE: 81 MMHG | HEIGHT: 62 IN | BODY MASS INDEX: 23.37 KG/M2 | WEIGHT: 127 LBS | SYSTOLIC BLOOD PRESSURE: 130 MMHG

## 2024-01-18 PROCEDURE — 99024 POSTOP FOLLOW-UP VISIT: CPT

## 2024-01-19 NOTE — HISTORY OF PRESENT ILLNESS
[Pain is well-controlled] : pain is well-controlled [Fever] : no fever [Chills] : no chills [Nausea] : no nausea [Vomiting] : no vomiting [Clean/Dry/Intact] : clean, dry and intact [Erythema] : not erythematous [None] : no vaginal bleeding [Normal] : normal [Pathology reviewed] : pathology reviewed [de-identified] : s/p laparoscopic rt cornual resection doing well no complaints

## 2024-01-19 NOTE — PLAN
[FreeTextEntry1] : doing well discussed need for primary c/s,, no pregnancy x 6 months to fu with pmd

## 2024-01-25 ENCOUNTER — APPOINTMENT (OUTPATIENT)
Dept: ULTRASOUND IMAGING | Facility: CLINIC | Age: 29
End: 2024-01-25
Payer: COMMERCIAL

## 2024-01-25 PROCEDURE — 93970 EXTREMITY STUDY: CPT

## 2024-05-23 NOTE — OB RN PATIENT PROFILE - BREAST MILK IS MORE DIGESTIBLE, MAKING VOMITING, DIARRHEA, GAS AND CONSTIPATION LESS COMMON
The change in the hemoglobin is minimal and due to having periods in general. She can take an iron supplement once daily with food if she desires. She needs to drink plenty of water and eat fiber in her diet as iron can be constipating. The low triglycerides are good as it means she has no risk for heart disease. The WBC  is minimally low and can happen spontaneously or with viral infections. None of these lab values need an intervention at this time. If she desires, I can repeat her CBC in 4 weeks.   Statement Selected

## 2024-09-11 NOTE — OB RN PATIENT PROFILE - NS PRO TALK SOMEONE YN

## 2024-11-11 ENCOUNTER — ASOB RESULT (OUTPATIENT)
Age: 29
End: 2024-11-11

## 2024-11-11 ENCOUNTER — APPOINTMENT (OUTPATIENT)
Dept: ANTEPARTUM | Facility: CLINIC | Age: 29
End: 2024-11-11
Payer: COMMERCIAL

## 2024-11-11 PROCEDURE — 99202 OFFICE O/P NEW SF 15 MIN: CPT | Mod: 25

## 2024-11-11 PROCEDURE — 76815 OB US LIMITED FETUS(S): CPT

## 2024-11-20 ENCOUNTER — APPOINTMENT (OUTPATIENT)
Dept: ANTEPARTUM | Facility: CLINIC | Age: 29
End: 2024-11-20

## 2024-12-10 ENCOUNTER — APPOINTMENT (OUTPATIENT)
Dept: ANTEPARTUM | Facility: CLINIC | Age: 29
End: 2024-12-10

## 2025-01-15 ENCOUNTER — APPOINTMENT (OUTPATIENT)
Dept: ANTEPARTUM | Facility: CLINIC | Age: 30
End: 2025-01-15

## 2025-01-15 ENCOUNTER — ASOB RESULT (OUTPATIENT)
Age: 30
End: 2025-01-15

## 2025-01-15 PROCEDURE — 99212 OFFICE O/P EST SF 10 MIN: CPT | Mod: 25

## 2025-01-15 PROCEDURE — 76811 OB US DETAILED SNGL FETUS: CPT

## 2025-01-22 DIAGNOSIS — Z78.9 OTHER SPECIFIED HEALTH STATUS: ICD-10-CM

## 2025-02-12 ENCOUNTER — APPOINTMENT (OUTPATIENT)
Dept: PEDIATRIC CARDIOLOGY | Facility: CLINIC | Age: 30
End: 2025-02-12

## 2025-02-12 PROCEDURE — 76827 ECHO EXAM OF FETAL HEART: CPT

## 2025-02-12 PROCEDURE — 76820 UMBILICAL ARTERY ECHO: CPT

## 2025-02-12 PROCEDURE — 76821 MIDDLE CEREBRAL ARTERY ECHO: CPT

## 2025-02-12 PROCEDURE — 76825 ECHO EXAM OF FETAL HEART: CPT

## 2025-02-12 PROCEDURE — 93325 DOPPLER ECHO COLOR FLOW MAPG: CPT | Mod: 59

## 2025-02-12 PROCEDURE — 99202 OFFICE O/P NEW SF 15 MIN: CPT | Mod: 25

## 2025-02-27 ENCOUNTER — APPOINTMENT (OUTPATIENT)
Dept: ANTEPARTUM | Facility: CLINIC | Age: 30
End: 2025-02-27

## 2025-02-27 ENCOUNTER — ASOB RESULT (OUTPATIENT)
Age: 30
End: 2025-02-27

## 2025-02-27 PROCEDURE — 76816 OB US FOLLOW-UP PER FETUS: CPT

## 2025-03-28 NOTE — OB RN PATIENT PROFILE - NS_PREOPBLOODCONS_OBGYN_ALL_OB
[Never] : never [TextBox_4] : JOHN MACEWEN is a 73-year-old male, with history of lung nodule, who presents to the office for follow up evaluation. Patient reports that he is feeling well overall with no respiratory complaints. He is Jan 2025 s/p right total knee replacement and is recovering well; is being physically active.  Had recent CT chest stale nodules on March 11th  soon after that developed low grade fever and chest congestion had moderate mucus slight color can't sleep at night because of coughing using Robitussin only no wheeze or sob using astlin nasal spray  No

## 2025-03-31 ENCOUNTER — APPOINTMENT (OUTPATIENT)
Dept: ANTEPARTUM | Facility: CLINIC | Age: 30
End: 2025-03-31
Payer: COMMERCIAL

## 2025-03-31 ENCOUNTER — ASOB RESULT (OUTPATIENT)
Age: 30
End: 2025-03-31

## 2025-03-31 PROCEDURE — 76816 OB US FOLLOW-UP PER FETUS: CPT

## 2025-03-31 PROCEDURE — 76819 FETAL BIOPHYS PROFIL W/O NST: CPT | Mod: 59

## 2025-03-31 PROCEDURE — 76820 UMBILICAL ARTERY ECHO: CPT | Mod: 59

## 2025-04-03 NOTE — PRE-ANESTHESIA EVALUATION ADULT - MALLAMPATI CLASS
What Is The Reason For Today's Visit?: Full Body Skin Examination Class I (easy) - visualization of the soft palate, fauces, uvula, and both anterior and posterior pillars

## 2025-04-08 ENCOUNTER — APPOINTMENT (OUTPATIENT)
Dept: ANTEPARTUM | Facility: CLINIC | Age: 30
End: 2025-04-08
Payer: COMMERCIAL

## 2025-04-08 ENCOUNTER — ASOB RESULT (OUTPATIENT)
Age: 30
End: 2025-04-08

## 2025-04-08 PROCEDURE — 76819 FETAL BIOPHYS PROFIL W/O NST: CPT

## 2025-04-08 PROCEDURE — 76820 UMBILICAL ARTERY ECHO: CPT

## 2025-04-15 ENCOUNTER — APPOINTMENT (OUTPATIENT)
Dept: ANTEPARTUM | Facility: CLINIC | Age: 30
End: 2025-04-15
Payer: COMMERCIAL

## 2025-04-15 ENCOUNTER — ASOB RESULT (OUTPATIENT)
Age: 30
End: 2025-04-15

## 2025-04-15 PROCEDURE — 76820 UMBILICAL ARTERY ECHO: CPT

## 2025-04-15 PROCEDURE — 76819 FETAL BIOPHYS PROFIL W/O NST: CPT

## 2025-04-21 ENCOUNTER — ASOB RESULT (OUTPATIENT)
Age: 30
End: 2025-04-21

## 2025-04-21 ENCOUNTER — APPOINTMENT (OUTPATIENT)
Dept: ANTEPARTUM | Facility: CLINIC | Age: 30
End: 2025-04-21
Payer: COMMERCIAL

## 2025-04-21 PROCEDURE — 76819 FETAL BIOPHYS PROFIL W/O NST: CPT | Mod: 59

## 2025-04-21 PROCEDURE — 76816 OB US FOLLOW-UP PER FETUS: CPT

## 2025-04-21 PROCEDURE — 76820 UMBILICAL ARTERY ECHO: CPT | Mod: 59

## 2025-05-12 ENCOUNTER — ASOB RESULT (OUTPATIENT)
Age: 30
End: 2025-05-12

## 2025-05-12 ENCOUNTER — APPOINTMENT (OUTPATIENT)
Dept: ANTEPARTUM | Facility: CLINIC | Age: 30
End: 2025-05-12
Payer: COMMERCIAL

## 2025-05-12 PROCEDURE — 76816 OB US FOLLOW-UP PER FETUS: CPT

## 2025-05-13 ENCOUNTER — OUTPATIENT (OUTPATIENT)
Dept: OUTPATIENT SERVICES | Facility: HOSPITAL | Age: 30
LOS: 1 days | End: 2025-05-13
Payer: COMMERCIAL

## 2025-05-13 VITALS
TEMPERATURE: 98 F | SYSTOLIC BLOOD PRESSURE: 121 MMHG | DIASTOLIC BLOOD PRESSURE: 78 MMHG | RESPIRATION RATE: 18 BRPM | HEART RATE: 114 BPM | OXYGEN SATURATION: 98 %

## 2025-05-13 VITALS — TEMPERATURE: 98 F

## 2025-05-13 DIAGNOSIS — Z90.79 ACQUIRED ABSENCE OF OTHER GENITAL ORGAN(S): Chronic | ICD-10-CM

## 2025-05-13 DIAGNOSIS — O26.899 OTHER SPECIFIED PREGNANCY RELATED CONDITIONS, UNSPECIFIED TRIMESTER: ICD-10-CM

## 2025-05-13 PROCEDURE — G0463: CPT

## 2025-05-13 PROCEDURE — 99212 OFFICE O/P EST SF 10 MIN: CPT

## 2025-05-13 NOTE — OB PROVIDER TRIAGE NOTE - NSOBPROVIDERNOTE_OBGYN_ALL_OB_FT
Assessment  30F  at 36.6wks gestational age with history of LSC excision of R cornual ectopic, evaluated for ROL. Labor ruled out at this time secondary to unchanged cervical exam and infrequent, painless contractions. Fetal status reassuring at this time as FHT reactive and reassuring.     Plan  -Pt to be discharged home at this time  -Strict return precautions provided  -Strongly encouraged pt to follow up with Dr. Covington in office tomorrow as scheduled and for her scheduled  on 25  -Pt verbalized understanding and agreement to plan, all questions answered    Discussed with Dr. Adria Miller PA-C

## 2025-05-13 NOTE — OB PROVIDER TRIAGE NOTE - NSHPPHYSICALEXAM_GEN_ALL_CORE
Objective  – VS  T(C): 36.8 (05-13-25 @ 23:06)  HR: 89 (05-13-25 @ 23:05)  BP: 100/58 (05-13-25 @ 23:05)  RR: 18 (05-13-25 @ 22:03)  SpO2: 96% (05-13-25 @ 22:59)  – PE:   General: NAD  CV: RRR  Pulm: Breathing comfortably on RA, clear to auscultation b/l  Abd: Gravid, nontender  Extr: Moving all extremities with ease, nonedematous, non-TTP of b/l LE  – VE: 2/50/-3  – FHT: 140/moderate variability/+accels/-decels  – [place]: infrequent contractions  – EFW: 2950g by emi in office from 1 day ago Objective  – VS  T(C): 36.8 (05-13-25 @ 23:06)  HR: 89 (05-13-25 @ 23:05)  BP: 100/58 (05-13-25 @ 23:05)  RR: 18 (05-13-25 @ 22:03)  SpO2: 96% (05-13-25 @ 22:59)  – PE:   General: NAD  CV: RRR  Pulm: Breathing comfortably on RA, clear to auscultation b/l  Abd: Gravid, nontender  Extr: Moving all extremities with ease, nonedematous, non-TTP of b/l LE  – VE: 2/50/-3  – NST  Baseline  (   140    ) BPM  Variability ( x )  Moderate   (  ) Minimal  (  ) Absent  (  )  Marked  Accelerations ( x ) 15x15   (  ) 10x10  (  ) no  Decelerations ( x ) no  (  ) Variable  (  ) Early  (  ) Late      Description _________  Contractions (  ) no  ( x ) yes     Description  - infrequent contractions  Interpretation ( x ) reactive   (  )  non-reactive  – EFW: 2950g by sono in office from 1 day ago

## 2025-05-13 NOTE — OB PROVIDER TRIAGE NOTE - HISTORY OF PRESENT ILLNESS
PA Triage Note    Subjective  HPI: 30F  at 36.6wks gestational age with history of LSC excision of R cornual ectopic, presents for ROL. Pt reports experiencing intermittent pelvic pressure and feeling fetal movements lower in the pelvic region since today. Pt states having misty kinney for the past few weeks and that has not changes, rated 1/10 pain at its worst. +FM. -LOF. -VB. Pt denies any chest pain, palpitations, SOB, or any other concerns. Pt was last examined 1 week ago and found to be 2/40/-3. Pt has a follow up appointment scheduled with Dr. Covington tomorrow and a delivery via  scheduled for next 25.     – PNC: Subchorionic hematoma at 10 weeks, now resolved. GBS negative. EFW 2950g by sono in office from 1 day ago.   – OBHx:   1) , FT, , 6lb 12oz, without complications  2) , FT, , 5lb 9oz, without complications  3) SAB x1   4) Blighted ovum s/p D&E   5) R ectopic pregnancy s/p LSC R salpingectomy 10/2023  6) R cornual ectopic pregnancy s/p LSC excision 1/3/2024  – GynHx: See above. PCOS. Infertility on Levothyroxine 50mg daily.  – PMH: Denies asthma, thyroid disorders, renal/liver disease, bleeding/clotting disorders  – PSH: LSC R salpingectomy 10/2023. LSC excision 1/3/2024.  – Psych: Denies anxiety, depression  – Social: Denies T/E/D  – Meds: PNV, Levothyroxine 50mcg once daily, PO Fe, Vit D, Folic acid  – Allergies: Cefazolin (hives)  – Will accept blood transfusions? Yes

## 2025-05-13 NOTE — OB PROVIDER TRIAGE NOTE - NSICDXPASTMEDICALHX_GEN_ALL_CORE_FT
PAST MEDICAL HISTORY:  Ectopic pregnancy     PCOS (polycystic ovarian syndrome)      No Repair - Repaired With Adjacent Surgical Defect Text (Leave Blank If You Do Not Want): After obtaining clear surgical margins the defect was repaired concurrently with another surgical defect which was in close approximation.

## 2025-05-13 NOTE — OB PROVIDER TRIAGE NOTE - NS_OBGYNHISTORY_OBGYN_ALL_OB_FT
– PNC: Subchorionic hematoma at 10 weeks, now resolved. GBS negative. EFW 2950g by sono in office from 1 day ago.   – OBHx:   1) , FT, , 6lb 12oz, without complications  2) , FT, , 5lb 9oz, without complications  3) SAB x1   4) Blighted ovum s/p D&E   5) R ectopic pregnancy s/p LSC R salpingectomy 10/2023  6) R cornual ectopic pregnancy s/p LSC excision 1/3/2024  – GynHx: See above. PCOS. Infertility on Levothyroxine 50mg daily.

## 2025-05-14 ENCOUNTER — OUTPATIENT (OUTPATIENT)
Dept: OUTPATIENT SERVICES | Facility: HOSPITAL | Age: 30
LOS: 1 days | End: 2025-05-14
Payer: COMMERCIAL

## 2025-05-14 VITALS
TEMPERATURE: 98 F | RESPIRATION RATE: 20 BRPM | WEIGHT: 162.04 LBS | HEIGHT: 62 IN | OXYGEN SATURATION: 97 % | HEART RATE: 100 BPM | DIASTOLIC BLOOD PRESSURE: 77 MMHG | SYSTOLIC BLOOD PRESSURE: 112 MMHG

## 2025-05-14 DIAGNOSIS — O09.12 SUPERVISION OF PREGNANCY WITH HISTORY OF ECTOPIC PREGNANCY, SECOND TRIMESTER: ICD-10-CM

## 2025-05-14 DIAGNOSIS — Z01.818 ENCOUNTER FOR OTHER PREPROCEDURAL EXAMINATION: ICD-10-CM

## 2025-05-14 DIAGNOSIS — O09.11 SUPERVISION OF PREGNANCY WITH HISTORY OF ECTOPIC PREGNANCY, FIRST TRIMESTER: ICD-10-CM

## 2025-05-14 DIAGNOSIS — Z90.79 ACQUIRED ABSENCE OF OTHER GENITAL ORGAN(S): Chronic | ICD-10-CM

## 2025-05-14 LAB
BLD GP AB SCN SERPL QL: NEGATIVE — SIGNIFICANT CHANGE UP
HCT VFR BLD CALC: 37.9 % — SIGNIFICANT CHANGE UP (ref 34.5–45)
HGB BLD-MCNC: 13.1 G/DL — SIGNIFICANT CHANGE UP (ref 11.5–15.5)
MCHC RBC-ENTMCNC: 32.2 PG — SIGNIFICANT CHANGE UP (ref 27–34)
MCHC RBC-ENTMCNC: 34.6 G/DL — SIGNIFICANT CHANGE UP (ref 32–36)
MCV RBC AUTO: 93.1 FL — SIGNIFICANT CHANGE UP (ref 80–100)
NRBC BLD AUTO-RTO: 0 /100 WBCS — SIGNIFICANT CHANGE UP (ref 0–0)
PLATELET # BLD AUTO: 127 K/UL — LOW (ref 150–400)
RBC # BLD: 4.07 M/UL — SIGNIFICANT CHANGE UP (ref 3.8–5.2)
RBC # FLD: 13.7 % — SIGNIFICANT CHANGE UP (ref 10.3–14.5)
RH IG SCN BLD-IMP: POSITIVE — SIGNIFICANT CHANGE UP
WBC # BLD: 10.81 K/UL — HIGH (ref 3.8–10.5)
WBC # FLD AUTO: 10.81 K/UL — HIGH (ref 3.8–10.5)

## 2025-05-14 PROCEDURE — 86901 BLOOD TYPING SEROLOGIC RH(D): CPT

## 2025-05-14 PROCEDURE — 85027 COMPLETE CBC AUTOMATED: CPT

## 2025-05-14 PROCEDURE — 86900 BLOOD TYPING SEROLOGIC ABO: CPT

## 2025-05-14 PROCEDURE — 86850 RBC ANTIBODY SCREEN: CPT

## 2025-05-14 PROCEDURE — G0463: CPT

## 2025-05-14 RX ORDER — CLINDAMYCIN PHOSPHATE 150 MG/ML
900 VIAL (ML) INJECTION ONCE
Refills: 0 | Status: DISCONTINUED | OUTPATIENT
Start: 2025-05-22 | End: 2025-05-25

## 2025-05-14 RX ORDER — GENTAMICIN SULFATE 40 MG/ML
400 VIAL (ML) INJECTION ONCE
Refills: 0 | Status: DISCONTINUED | OUTPATIENT
Start: 2025-05-22 | End: 2025-05-25

## 2025-05-14 NOTE — OB PST NOTE - PROBLEM SELECTOR PLAN 1
Plan for primary c section on 5/22/25 with Dr. Covington.   PST labs sent   Pre procedure surgical scrub instructions discussed  Pre-op education provided - all questions answered

## 2025-05-14 NOTE — OB PST NOTE - HISTORY OF PRESENT ILLNESS
29 yo F , EDC 25, with prior cornual ectopic x 2, miscarriage x2, s/p laparoscopic excision of right cornual ectopic pregnancy in 2024.  Plan for primary c section on 25 with Dr. Covington.

## 2025-05-14 NOTE — OB PST NOTE - ASSESSMENT
CAPRINI SCORE    AGE RELATED RISK FACTORS                                                             [ ] Age 41-60 years                                            (1 Point)  [ ] Age: 61-74 years                                           (2 Points)                 [ ] Age= 75 years                                                (3 Points)             DISEASE RELATED RISK FACTORS                                                       [ ] Edema in the lower extremities                 (1 Point)                     [ ] Varicose veins                                               (1 Point)                                 [x ] BMI > 25 Kg/m2                                            (1 Point)                                  [ ] Serious infection (ie PNA)                            (1 Point)                     [ ] Lung disease ( COPD, Emphysema)            (1 Point)                                                                          [ ] Acute myocardial infarction                         (1 Point)                  [ ] Congestive heart failure (in the previous month)  (1 Point)         [ ] Inflammatory bowel disease                            (1 Point)                  [ ] Central venous access, PICC or Port               (2 points)       (within the last month)                                                                [ ] Stroke (in the previous month)                        (5 Points)    [ ] Previous or present malignancy                       (2 points)                                                                                                                                                         HEMATOLOGY RELATED FACTORS                                                         [ ] Prior episodes of VTE                                     (3 Points)                     [ ] Positive family history for VTE                      (3 Points)                  [ ] Prothrombin 20913 A                                     (3 Points)                     [ ] Factor V Leiden                                                (3 Points)                        [ ] Lupus anticoagulants                                      (3 Points)                                                           [ ] Anticardiolipin antibodies                              (3 Points)                                                       [ ] High homocysteine in the blood                   (3 Points)                                             [ ] Other congenital or acquired thrombophilia      (3 Points)                                                [ ] Heparin induced thrombocytopenia                  (3 Points)                                        MOBILITY RELATED FACTORS  [ ] Bed rest                                                         (1 Point)  [ ] Plaster cast                                                    (2 points)  [ ] Bed bound for more than 72 hours           (2 Points)    GENDER SPECIFIC FACTORS  [x ] Pregnancy or had a baby within the last month   (1 Point)  [ ] Post-partum < 6 weeks                                   (1 Point)  [ ] Hormonal therapy  or oral contraception   (1 Point)  [ ] History of pregnancy complications              (1 point)  [ ] Unexplained or recurrent              (1 Point)    OTHER RISK FACTORS                                           (1 Point)  [ ] BMI >40, smoking, diabetes requiring insulin, chemotherapy  blood transfusions and length of surgery over 2 hours    SURGERY RELATED RISK FACTORS  [ ]  Section within the last month     (1 Point)  [ ] Minor surgery                                                  (1 Point)  [ ] Arthroscopic surgery                                       (2 Points)  [x ] Planned major surgery lasting more            (2 Points)      than 45 minutes     [ ] Elective hip or knee joint replacement       (5 points)       surgery                                                TRAUMA RELATED RISK FACTORS  [ ] Fracture of the hip, pelvis, or leg                       (5 Points)  [ ] Spinal cord injury resulting in paralysis             (5 points)       (in the previous month)    [ ] Paralysis  (less than 1 month)                             (5 Points)  [ ] Multiple Trauma within 1 month                        (5 Points)    Total Score [   4     ]    Caprini Score 0-2: Low Risk, NO VTE prophylaxis required for most patients, encourage ambulation  Caprini Score 3-6: Moderate Risk , pharmacologic VTE prophylaxis is indicated for most patients (in the absence of contraindications)  Caprini Score Greater than or =7: High risk, pharmocologic VTE prophylaxis indicated for most patients (in the absence of contraindications)

## 2025-05-14 NOTE — OB PST NOTE - ALERT: PERTINENT HISTORY
[facility]  [address] [place] [id number]  Dept: [phone]      Physical Therapy Daily Note     Referring MD: Ebenezer Aguilar MD  Diagnosis:     ICD-10-CM    1. Acute pain of right knee  M25.561       2. Stiffness of right knee  M25.661       3. Muscle weakness  M62.81       4. Difficulty walking  R26.2           Surgery: RIGHT KNEE TOTAL ARTHROPLASTY ROBOTIC Date: 5/6/2024   Therapy precautions: blood bourne illness  Co-morbidities affecting plan of care: Obesity, HIV, L TKA 2/7/24, umbilical hernia          Chief complaints/history of injury: Pt underwent R TKA 5/6/24 secondary to DJD with 1 night acute care stay followed by home health PT 5/31/24. No complications to date.   Describe current symptoms: Pain, stiffness, swelling, R knee, weakness RLE . L knee still feels a little stiff at times.   Patient Stated Goals: return to walking without device at least 10 min, yard work, improve community mobility    Payor: Payor: NAYANA /  /  /  Billing pattern: Commercial- substantial/midpoint time each CPT   Total Timed Procedure Codes: 54 min, Total Time: 57 min Modifier needed: No  Episode visit count:  7     SUBJECTIVE     Patient reports that she has been performing up to 5 SLR at a time.     Medications: no changes since last session    OBJECTIVE       Treatment provided today:  Therapeutic exercise (69554) x 54 min to develop ROM, strength, endurance and flexibility RLE.  Upright bike level 3 x 5 min (3 holes showing)  Bilateral leg press 90# 3x10  Bilateral hamstring curls 40# 3x10  R knee flexion stretch on second step H10sec x 5  6-inch step ups with BUE support 3x5   Hooklying bridge 2x15  NMES R quadriceps 10sec on/10sec off   W/ quad set x 2 min  W/ short arc quad x 2 min  W/ long arc quad x 2 min  W/ seated SLR x 2 min  Standing TKE to R single leg stance against long green band H5sec x 25 w/ LUE support (stick)  Sit-stand with L foot forward 2x10  Standing R quad 
Fetal Sonogram/1st Trimester Sonogram/20 Week Level II Sonogram/BioPhysical Profile(s)/Follow up Sonogram for Growth/Non Invasive Prenatal Screen (NIPS)

## 2025-05-15 DIAGNOSIS — O09.13 SUPERVISION OF PREGNANCY WITH HISTORY OF ECTOPIC PREGNANCY, THIRD TRIMESTER: ICD-10-CM

## 2025-05-15 DIAGNOSIS — Z3A.36 36 WEEKS GESTATION OF PREGNANCY: ICD-10-CM

## 2025-05-15 DIAGNOSIS — O99.283 ENDOCRINE, NUTRITIONAL AND METABOLIC DISEASES COMPLICATING PREGNANCY, THIRD TRIMESTER: ICD-10-CM

## 2025-05-15 DIAGNOSIS — O09.03 SUPERVISION OF PREGNANCY WITH HISTORY OF INFERTILITY, THIRD TRIMESTER: ICD-10-CM

## 2025-05-15 DIAGNOSIS — E28.2 POLYCYSTIC OVARIAN SYNDROME: ICD-10-CM

## 2025-05-15 DIAGNOSIS — O09.293 SUPERVISION OF PREGNANCY WITH OTHER POOR REPRODUCTIVE OR OBSTETRIC HISTORY, THIRD TRIMESTER: ICD-10-CM

## 2025-05-15 DIAGNOSIS — O47.03 FALSE LABOR BEFORE 37 COMPLETED WEEKS OF GESTATION, THIRD TRIMESTER: ICD-10-CM

## 2025-05-15 DIAGNOSIS — O36.8130 DECREASED FETAL MOVEMENTS, THIRD TRIMESTER, NOT APPLICABLE OR UNSPECIFIED: ICD-10-CM

## 2025-05-22 ENCOUNTER — INPATIENT (INPATIENT)
Facility: HOSPITAL | Age: 30
LOS: 2 days | Discharge: HOME | DRG: 998 | End: 2025-05-25
Attending: OBSTETRICS & GYNECOLOGY | Admitting: OBSTETRICS & GYNECOLOGY
Payer: COMMERCIAL

## 2025-05-22 ENCOUNTER — TRANSCRIPTION ENCOUNTER (OUTPATIENT)
Age: 30
End: 2025-05-22

## 2025-05-22 VITALS
DIASTOLIC BLOOD PRESSURE: 64 MMHG | HEART RATE: 107 BPM | SYSTOLIC BLOOD PRESSURE: 98 MMHG | RESPIRATION RATE: 18 BRPM | TEMPERATURE: 98 F

## 2025-05-22 DIAGNOSIS — Z90.79 ACQUIRED ABSENCE OF OTHER GENITAL ORGAN(S): Chronic | ICD-10-CM

## 2025-05-22 DIAGNOSIS — O09.12 SUPERVISION OF PREGNANCY WITH HISTORY OF ECTOPIC PREGNANCY, SECOND TRIMESTER: ICD-10-CM

## 2025-05-22 LAB
BLD GP AB SCN SERPL QL: NEGATIVE — SIGNIFICANT CHANGE UP
HCT VFR BLD CALC: 35.3 % — SIGNIFICANT CHANGE UP (ref 34.5–45)
HGB BLD-MCNC: 12.4 G/DL — SIGNIFICANT CHANGE UP (ref 11.5–15.5)
MCHC RBC-ENTMCNC: 32.5 PG — SIGNIFICANT CHANGE UP (ref 27–34)
MCHC RBC-ENTMCNC: 35.1 G/DL — SIGNIFICANT CHANGE UP (ref 32–36)
MCV RBC AUTO: 92.7 FL — SIGNIFICANT CHANGE UP (ref 80–100)
NRBC BLD AUTO-RTO: 0 /100 WBCS — SIGNIFICANT CHANGE UP (ref 0–0)
PLATELET # BLD AUTO: 132 K/UL — LOW (ref 150–400)
RBC # BLD: 3.81 M/UL — SIGNIFICANT CHANGE UP (ref 3.8–5.2)
RBC # FLD: 13.5 % — SIGNIFICANT CHANGE UP (ref 10.3–14.5)
RH IG SCN BLD-IMP: POSITIVE — SIGNIFICANT CHANGE UP
T PALLIDUM AB TITR SER: NEGATIVE — SIGNIFICANT CHANGE UP
WBC # BLD: 17.24 K/UL — HIGH (ref 3.8–10.5)
WBC # FLD AUTO: 17.24 K/UL — HIGH (ref 3.8–10.5)

## 2025-05-22 PROCEDURE — 59514 CESAREAN DELIVERY ONLY: CPT | Mod: AS

## 2025-05-22 RX ORDER — NALBUPHINE HYDROCHLORIDE 10 MG/ML
2.5 INJECTION INTRAMUSCULAR; INTRAVENOUS; SUBCUTANEOUS EVERY 6 HOURS
Refills: 0 | Status: DISCONTINUED | OUTPATIENT
Start: 2025-05-22 | End: 2025-05-23

## 2025-05-22 RX ORDER — DIPHENHYDRAMINE HCL 12.5MG/5ML
25 ELIXIR ORAL EVERY 6 HOURS
Refills: 0 | Status: DISCONTINUED | OUTPATIENT
Start: 2025-05-22 | End: 2025-05-25

## 2025-05-22 RX ORDER — ACETAMINOPHEN 500 MG/5ML
975 LIQUID (ML) ORAL
Refills: 0 | Status: DISCONTINUED | OUTPATIENT
Start: 2025-05-22 | End: 2025-05-25

## 2025-05-22 RX ORDER — DIPHENHYDRAMINE HCL 12.5MG/5ML
25 ELIXIR ORAL ONCE
Refills: 0 | Status: DISCONTINUED | OUTPATIENT
Start: 2025-05-22 | End: 2025-05-25

## 2025-05-22 RX ORDER — SODIUM CHLORIDE 9 G/1000ML
1000 INJECTION, SOLUTION INTRAVENOUS
Refills: 0 | Status: DISCONTINUED | OUTPATIENT
Start: 2025-05-22 | End: 2025-05-22

## 2025-05-22 RX ORDER — CLINDAMYCIN PHOSPHATE 150 MG/ML
900 VIAL (ML) INJECTION EVERY 8 HOURS
Refills: 0 | Status: COMPLETED | OUTPATIENT
Start: 2025-05-22 | End: 2025-05-23

## 2025-05-22 RX ORDER — MODIFIED LANOLIN 100 %
1 CREAM (GRAM) TOPICAL EVERY 6 HOURS
Refills: 0 | Status: DISCONTINUED | OUTPATIENT
Start: 2025-05-22 | End: 2025-05-25

## 2025-05-22 RX ORDER — HEPARIN SODIUM 1000 [USP'U]/ML
5000 INJECTION INTRAVENOUS; SUBCUTANEOUS
Refills: 0 | Status: DISCONTINUED | OUTPATIENT
Start: 2025-05-22 | End: 2025-05-25

## 2025-05-22 RX ORDER — OXYCODONE HYDROCHLORIDE 30 MG/1
10 TABLET ORAL
Refills: 0 | Status: DISCONTINUED | OUTPATIENT
Start: 2025-05-22 | End: 2025-05-23

## 2025-05-22 RX ORDER — SIMETHICONE 80 MG
80 TABLET,CHEWABLE ORAL EVERY 4 HOURS
Refills: 0 | Status: DISCONTINUED | OUTPATIENT
Start: 2025-05-22 | End: 2025-05-25

## 2025-05-22 RX ORDER — ONDANSETRON HCL/PF 4 MG/2 ML
4 VIAL (ML) INJECTION EVERY 6 HOURS
Refills: 0 | Status: DISCONTINUED | OUTPATIENT
Start: 2025-05-22 | End: 2025-05-23

## 2025-05-22 RX ORDER — KETOROLAC TROMETHAMINE 30 MG/ML
30 INJECTION, SOLUTION INTRAMUSCULAR; INTRAVENOUS EVERY 6 HOURS
Refills: 0 | Status: COMPLETED | OUTPATIENT
Start: 2025-05-22 | End: 2025-05-23

## 2025-05-22 RX ORDER — CITRIC ACID/SODIUM CITRATE 300-500 MG
15 SOLUTION, ORAL ORAL ONCE
Refills: 0 | Status: COMPLETED | OUTPATIENT
Start: 2025-05-22 | End: 2025-05-22

## 2025-05-22 RX ORDER — OXYCODONE HYDROCHLORIDE 30 MG/1
5 TABLET ORAL
Refills: 0 | Status: COMPLETED | OUTPATIENT
Start: 2025-05-22 | End: 2025-05-29

## 2025-05-22 RX ORDER — OXYCODONE HYDROCHLORIDE 30 MG/1
5 TABLET ORAL
Refills: 0 | Status: DISCONTINUED | OUTPATIENT
Start: 2025-05-22 | End: 2025-05-23

## 2025-05-22 RX ORDER — OXYCODONE HYDROCHLORIDE 30 MG/1
5 TABLET ORAL ONCE
Refills: 0 | Status: DISCONTINUED | OUTPATIENT
Start: 2025-05-22 | End: 2025-05-25

## 2025-05-22 RX ORDER — SODIUM CHLORIDE 9 G/1000ML
1000 INJECTION, SOLUTION INTRAVENOUS
Refills: 0 | Status: DISCONTINUED | OUTPATIENT
Start: 2025-05-22 | End: 2025-05-25

## 2025-05-22 RX ORDER — NALOXONE HYDROCHLORIDE 0.4 MG/ML
0.1 INJECTION, SOLUTION INTRAMUSCULAR; INTRAVENOUS; SUBCUTANEOUS
Refills: 0 | Status: DISCONTINUED | OUTPATIENT
Start: 2025-05-22 | End: 2025-05-23

## 2025-05-22 RX ORDER — DEXAMETHASONE 0.5 MG/1
4 TABLET ORAL EVERY 6 HOURS
Refills: 0 | Status: DISCONTINUED | OUTPATIENT
Start: 2025-05-22 | End: 2025-05-23

## 2025-05-22 RX ORDER — LEVOTHYROXINE SODIUM 300 MCG
50 TABLET ORAL DAILY
Refills: 0 | Status: DISCONTINUED | OUTPATIENT
Start: 2025-05-22 | End: 2025-05-25

## 2025-05-22 RX ORDER — IBUPROFEN 200 MG
600 TABLET ORAL EVERY 6 HOURS
Refills: 0 | Status: COMPLETED | OUTPATIENT
Start: 2025-05-22 | End: 2026-04-20

## 2025-05-22 RX ORDER — CLOSTRIDIUM TETANI TOXOID ANTIGEN (FORMALDEHYDE INACTIVATED), CORYNEBACTERIUM DIPHTHERIAE TOXOID ANTIGEN (FORMALDEHYDE INACTIVATED), BORDETELLA PERTUSSIS TOXOID ANTIGEN (GLUTARALDEHYDE INACTIVATED), BORDETELLA PERTUSSIS FILAMENTOUS HEMAGGLUTININ ANTIGEN (FORMALDEHYDE INACTIVATED), BORDETELLA PERTUSSIS PERTACTIN ANTIGEN, AND BORDETELLA PERTUSSIS FIMBRIAE 2/3 ANTIGEN 5; 2; 2.5; 5; 3; 5 [LF]/.5ML; [LF]/.5ML; UG/.5ML; UG/.5ML; UG/.5ML; UG/.5ML
0.5 INJECTION, SUSPENSION INTRAMUSCULAR ONCE
Refills: 0 | Status: DISCONTINUED | OUTPATIENT
Start: 2025-05-22 | End: 2025-05-25

## 2025-05-22 RX ORDER — OXYTOCIN-SODIUM CHLORIDE 0.9% IV SOLN 30 UNIT/500ML 30-0.9/5 UT/ML-%
42 SOLUTION INTRAVENOUS
Qty: 30 | Refills: 0 | Status: DISCONTINUED | OUTPATIENT
Start: 2025-05-22 | End: 2025-05-25

## 2025-05-22 RX ORDER — MAGNESIUM HYDROXIDE 400 MG/5ML
30 SUSPENSION ORAL
Refills: 0 | Status: DISCONTINUED | OUTPATIENT
Start: 2025-05-22 | End: 2025-05-25

## 2025-05-22 RX ADMIN — SODIUM CHLORIDE 125 MILLILITER(S): 9 INJECTION, SOLUTION INTRAVENOUS at 14:31

## 2025-05-22 RX ADMIN — OXYTOCIN-SODIUM CHLORIDE 0.9% IV SOLN 30 UNIT/500ML 42 MILLIUNIT(S)/MIN: 30-0.9/5 SOLUTION at 16:47

## 2025-05-22 RX ADMIN — Medication 1 APPLICATION(S): at 14:32

## 2025-05-22 RX ADMIN — Medication 20 MILLIGRAM(S): at 14:32

## 2025-05-22 RX ADMIN — Medication 15 MILLILITER(S): at 14:32

## 2025-05-22 NOTE — OB PROVIDER H&P - HISTORY OF PRESENT ILLNESS
31yo  @ 38.1 weeks (ANY ) presents for scheduled pLTCS for hx of LSC excision of R cornual ectopic. Pregnancy course otherwise uncomplicated. +FM, -LOF, -VB, -CTX    PNC: Subchorionic hematoma at 10 weeks, now resolved. GBS negative. EFW 3100.     – OBHx:   1) , FT, , 6lb 12oz, without complications  2) , FT, , 5lb 9oz, without complications  3) SAB x1   4) Blighted ovum s/p D&E   5) R ectopic pregnancy s/p LSC R salpingectomy 10/2023  6) R cornual ectopic pregnancy s/p LSC excision 1/3/2024  – GynHx: See above. PCOS. Infertility on Levothyroxine 50mg daily.  – PMH: Denies asthma, thyroid disorders, renal/liver disease, bleeding/clotting disorders  – PSH: LSC R salpingectomy 10/2023. LSC excision 1/3/2024.  – Psych: Denies anxiety, depression

## 2025-05-22 NOTE — OB PROVIDER H&P - ASSESSMENT
A/P: 31yo  @ 38.1 presents for pLTCS.   - Admit to L&D  - Routine labs   - EFM/TOCO  - IVH/NPO  - Anesthesia consult   - Bicitra  - Famotidine   - for pTLCS    dw Dr Covington  [facility]

## 2025-05-22 NOTE — OB RN DELIVERY SUMMARY - BABY A SEX
Alpesh Diallo is here today for No chief complaint on file.    Concerns/symptoms: leg pain    Medications: medications verified and updated  Refills needed today? No     Tobacco history: verified     Advanced Directives: No not on file, not interested.    BP >140/90? No    Care Teams Updated? Yes    Patient Preference for result Communication via:   Cell Phone:   Telephone Information:   Mobile [phone]     Okay to leave a message containing results? Yes    Health Maintenance Due   Topic Date Due   • Depression Screening  05/02/2024      Patient is due for topics as listed above but is not proceeding with Depression Screening  at this time.     Immunization History   Administered Date(s) Administered   • COVID Moderna/Spikevax 12+ (2525-1691) 10/11/2023   • COVID Pfizer 12Y+ 11/03/2021   • COVID Pfizer 12Y+ (Requires Dilution) 02/03/2021, 02/24/2021, 11/03/2021   • COVID Pfizer Bivalent 12Y+ 09/29/2022   • Influenza, High Dose quadrivalent, preserve-free 10/11/2023   • Influenza, quadrivalent, adjuvanted (Fluad) 11/03/2021   • Pneumococcal Conjugate 13 Valent Vacc (Prevnar 13) 02/24/2015, 03/29/2017   • Pneumococcal Polysaccharide Vacc (Pneumovax 23) 06/28/2023   • Shingrix (Shingles Zoster) 05/26/2021, 06/27/2022   • TD Adult, Unspecified Formulation 06/23/2010   • Tdap 08/21/2020, 08/21/2020   • Zostavax (Zoster Shingles) 04/16/2015   • influenza, trivalent, adjuvanted 09/29/2022         PHQ 2:  PHQ 2 Score Adult PHQ 2 Score Adult PHQ 2 Interpretation Little interest or pleasure in activity?   5/2/2023   9:24 AM 0 No further screening needed 0       PHQ 9:          Female

## 2025-05-22 NOTE — OB PROVIDER DELIVERY SUMMARY - NSSELHIDDEN_OBGYN_ALL_OB_FT
[NS_DeliveryAttending1_OBGYN_ALL_OB_FT:[email or web address]=],[NS_DeliveryAssist1_OBGYN_ALL_OB_FT:[id number]=]

## 2025-05-22 NOTE — OB RN DELIVERY SUMMARY - NS_SEPSISRSKCALC_OBGYN_ALL_OB_FT
Attending Attestation (For Attendings USE Only)...
EOS calculated successfully. EOS Risk Factor: 0.5/1000 live births ([facility] national incidence); GA=38w1d; Temp=98.24; ROM=0.017; GBS='Positive'; Antibiotics='No antibiotics or any antibiotics < 2 hrs prior to birth'

## 2025-05-22 NOTE — OB RN DELIVERY SUMMARY - NSSELHIDDEN_OBGYN_ALL_OB_FT
[NS_DeliveryAttending1_OBGYN_ALL_OB_FT:[email or web address]=],[NS_DeliveryAssist1_OBGYN_ALL_OB_FT:[id number]=],[NS_DeliveryRN_OBGYN_ALL_OB_FT:[id number]=]

## 2025-05-22 NOTE — OB PROVIDER H&P - NSHPPHYSICALEXAM_GEN_ALL_CORE
PE:  T(C): --  HR: 107 (05-22-25 @ 13:46) (107 - 107)  BP: 98/64 (05-22-25 @ 13:46) (98/64 - 98/64)  RR: --  SpO2: --  General: NAD, A&Ox3  CV: RRR  Lungs: Clear bilat   Abd: soft, nontender, gravid  VE: deferred  EFM: 145/moderate variablity/+accels/-decels  TOCO: irregular

## 2025-05-22 NOTE — OB PROVIDER DELIVERY SUMMARY - NSPROVIDERDELIVERYNOTE_OBGYN_ALL_OB_FT
scheduled pLTCS for hx of cornual ectopic   Viable female infant, apgars 9/9  Hysterotomy closed in 2 layers using PDS  Grossly normal uterus, tubes, and ovaries  Abdomen closed in standard fashion  Pt and infant to recovery in stable condition    EBL: 800   IVF: 1500    UOP: 300    Dictation # scheduled pLTCS for hx of cornual ectopic   Viable female infant, apgars 9/9  Hysterotomy closed in 2 layers using PDS  Grossly normal uterus, tubes, and ovaries  Abdomen closed in standard fashion  Pt and infant to recovery in stable condition    EBL: 800   IVF: 1500    UOP: 300    Dictation #[id number]

## 2025-05-22 NOTE — OB RN INTRAOPERATIVE NOTE - NSSELHIDDEN_OBGYN_ALL_OB_FT
[NS_DeliveryAttending1_OBGYN_ALL_OB_FT:[email or web address]=],[NS_DeliveryAssist1_OBGYN_ALL_OB_FT:[id number]=],[NS_DeliveryRN_OBGYN_ALL_OB_FT:[id number]=]

## 2025-05-23 ENCOUNTER — TRANSCRIPTION ENCOUNTER (OUTPATIENT)
Age: 30
End: 2025-05-23

## 2025-05-23 LAB
BASOPHILS # BLD AUTO: 0.05 K/UL — SIGNIFICANT CHANGE UP (ref 0–0.2)
BASOPHILS NFR BLD AUTO: 0.3 % — SIGNIFICANT CHANGE UP (ref 0–2)
EOSINOPHIL # BLD AUTO: 0.01 K/UL — SIGNIFICANT CHANGE UP (ref 0–0.5)
EOSINOPHIL NFR BLD AUTO: 0.1 % — SIGNIFICANT CHANGE UP (ref 0–6)
HCT VFR BLD CALC: 32.8 % — LOW (ref 34.5–45)
HGB BLD-MCNC: 11.1 G/DL — LOW (ref 11.5–15.5)
IMM GRANULOCYTES NFR BLD AUTO: 1.9 % — HIGH (ref 0–0.9)
LYMPHOCYTES # BLD AUTO: 1.4 K/UL — SIGNIFICANT CHANGE UP (ref 1–3.3)
LYMPHOCYTES # BLD AUTO: 8.6 % — LOW (ref 13–44)
MCHC RBC-ENTMCNC: 32.3 PG — SIGNIFICANT CHANGE UP (ref 27–34)
MCHC RBC-ENTMCNC: 33.8 G/DL — SIGNIFICANT CHANGE UP (ref 32–36)
MCV RBC AUTO: 95.3 FL — SIGNIFICANT CHANGE UP (ref 80–100)
MONOCYTES # BLD AUTO: 1.36 K/UL — HIGH (ref 0–0.9)
MONOCYTES NFR BLD AUTO: 8.4 % — SIGNIFICANT CHANGE UP (ref 2–14)
NEUTROPHILS # BLD AUTO: 13.09 K/UL — HIGH (ref 1.8–7.4)
NEUTROPHILS NFR BLD AUTO: 80.7 % — HIGH (ref 43–77)
NRBC BLD AUTO-RTO: 0 /100 WBCS — SIGNIFICANT CHANGE UP (ref 0–0)
PLATELET # BLD AUTO: 147 K/UL — LOW (ref 150–400)
RBC # BLD: 3.44 M/UL — LOW (ref 3.8–5.2)
RBC # FLD: 13.6 % — SIGNIFICANT CHANGE UP (ref 10.3–14.5)
WBC # BLD: 16.22 K/UL — HIGH (ref 3.8–10.5)
WBC # FLD AUTO: 16.22 K/UL — HIGH (ref 3.8–10.5)

## 2025-05-23 RX ORDER — ACETAMINOPHEN 500 MG/5ML
3 LIQUID (ML) ORAL
Qty: 0 | Refills: 0 | DISCHARGE
Start: 2025-05-23

## 2025-05-23 RX ORDER — LEVOTHYROXINE SODIUM 300 MCG
1 TABLET ORAL
Refills: 0 | DISCHARGE

## 2025-05-23 RX ORDER — ERGOCALCIFEROL 1.25 MG/1
1 CAPSULE ORAL
Refills: 0 | DISCHARGE

## 2025-05-23 RX ORDER — FERROUS SULFATE 137(45) MG
1 TABLET, EXTENDED RELEASE ORAL
Refills: 0 | DISCHARGE

## 2025-05-23 RX ORDER — FOLIC ACID 1 MG/1
1 TABLET ORAL
Refills: 0 | DISCHARGE

## 2025-05-23 RX ORDER — IBUPROFEN 200 MG
1 TABLET ORAL
Qty: 0 | Refills: 0 | DISCHARGE
Start: 2025-05-23

## 2025-05-23 RX ORDER — IBUPROFEN 200 MG
600 TABLET ORAL EVERY 6 HOURS
Refills: 0 | Status: DISCONTINUED | OUTPATIENT
Start: 2025-05-23 | End: 2025-05-25

## 2025-05-23 RX ADMIN — Medication 600 MILLIGRAM(S): at 17:12

## 2025-05-23 RX ADMIN — HEPARIN SODIUM 5000 UNIT(S): 1000 INJECTION INTRAVENOUS; SUBCUTANEOUS at 00:41

## 2025-05-23 RX ADMIN — Medication 600 MILLIGRAM(S): at 23:23

## 2025-05-23 RX ADMIN — KETOROLAC TROMETHAMINE 30 MILLIGRAM(S): 30 INJECTION, SOLUTION INTRAMUSCULAR; INTRAVENOUS at 11:47

## 2025-05-23 RX ADMIN — Medication 100 MILLIGRAM(S): at 15:40

## 2025-05-23 RX ADMIN — KETOROLAC TROMETHAMINE 30 MILLIGRAM(S): 30 INJECTION, SOLUTION INTRAMUSCULAR; INTRAVENOUS at 12:50

## 2025-05-23 RX ADMIN — HEPARIN SODIUM 5000 UNIT(S): 1000 INJECTION INTRAVENOUS; SUBCUTANEOUS at 23:23

## 2025-05-23 RX ADMIN — KETOROLAC TROMETHAMINE 30 MILLIGRAM(S): 30 INJECTION, SOLUTION INTRAMUSCULAR; INTRAVENOUS at 00:13

## 2025-05-23 RX ADMIN — Medication 975 MILLIGRAM(S): at 09:20

## 2025-05-23 RX ADMIN — KETOROLAC TROMETHAMINE 30 MILLIGRAM(S): 30 INJECTION, SOLUTION INTRAMUSCULAR; INTRAVENOUS at 05:36

## 2025-05-23 RX ADMIN — Medication 50 MICROGRAM(S): at 05:35

## 2025-05-23 RX ADMIN — KETOROLAC TROMETHAMINE 30 MILLIGRAM(S): 30 INJECTION, SOLUTION INTRAMUSCULAR; INTRAVENOUS at 01:00

## 2025-05-23 RX ADMIN — Medication 975 MILLIGRAM(S): at 08:48

## 2025-05-23 RX ADMIN — Medication 975 MILLIGRAM(S): at 14:03

## 2025-05-23 RX ADMIN — Medication 975 MILLIGRAM(S): at 21:00

## 2025-05-23 RX ADMIN — KETOROLAC TROMETHAMINE 30 MILLIGRAM(S): 30 INJECTION, SOLUTION INTRAMUSCULAR; INTRAVENOUS at 06:16

## 2025-05-23 RX ADMIN — Medication 975 MILLIGRAM(S): at 02:31

## 2025-05-23 RX ADMIN — Medication 975 MILLIGRAM(S): at 20:22

## 2025-05-23 RX ADMIN — Medication 100 MILLIGRAM(S): at 08:48

## 2025-05-23 RX ADMIN — Medication 975 MILLIGRAM(S): at 03:00

## 2025-05-23 RX ADMIN — Medication 975 MILLIGRAM(S): at 15:00

## 2025-05-23 RX ADMIN — HEPARIN SODIUM 5000 UNIT(S): 1000 INJECTION INTRAVENOUS; SUBCUTANEOUS at 11:47

## 2025-05-23 RX ADMIN — Medication 100 MILLIGRAM(S): at 00:42

## 2025-05-23 NOTE — DISCHARGE NOTE OB - CARE PROVIDER_API CALL
Dorys Covington  Obstetrics and Gynecology  [address], [address]  [place] [address]  Phone: [phone]  Fax: [phone]  Follow Up Time:

## 2025-05-23 NOTE — DISCHARGE NOTE OB - PATIENT PORTAL LINK FT
You can access the FollowMyHealth Patient Portal offered by [facility] by registering at the following website: [email or web address]. By joining [employer]’s FollowMyHealth portal, you will also be able to view your health information using other applications (apps) compatible with our system.

## 2025-05-23 NOTE — DISCHARGE NOTE OB - CARE PLAN
Principal Discharge DX:	 delivery delivered  Assessment and plan of treatment:	Return to baseline health, pain control, regular diet, Follow up with Dr. Covington.   1

## 2025-05-23 NOTE — DISCHARGE NOTE OB - MEDICATION SUMMARY - MEDICATIONS TO TAKE
I will START or STAY ON the medications listed below when I get home from the hospital:    ibuprofen 600 mg oral tablet  -- 1 tab(s) by mouth every 6 hours  -- Indication: For Cramping    acetaminophen 325 mg oral tablet  -- 3 tab(s) by mouth every 6 hours as needed for  mild pain  -- Indication: For mild pain

## 2025-05-23 NOTE — DISCHARGE NOTE OB - FINANCIAL ASSISTANCE
[facility] provides services at a reduced cost to those who are determined to be eligible through [facility]’s financial assistance program. Information regarding [facility]’s financial assistance program can be found by going to [email or web address] or by calling [phone].

## 2025-05-23 NOTE — DISCHARGE NOTE OB - NS MD DC FALL RISK RISK
For information on Fall & Injury Prevention, visit: [email or web address] OR  [email or web address] OR  https://www.cdc.gov/steadi/patient.html

## 2025-05-24 RX ORDER — OXYCODONE HYDROCHLORIDE 30 MG/1
5 TABLET ORAL
Refills: 0 | Status: DISCONTINUED | OUTPATIENT
Start: 2025-05-24 | End: 2025-05-25

## 2025-05-24 RX ADMIN — Medication 975 MILLIGRAM(S): at 15:10

## 2025-05-24 RX ADMIN — MAGNESIUM HYDROXIDE 30 MILLILITER(S): 400 SUSPENSION ORAL at 21:48

## 2025-05-24 RX ADMIN — Medication 600 MILLIGRAM(S): at 18:41

## 2025-05-24 RX ADMIN — Medication 1 APPLICATION(S): at 21:48

## 2025-05-24 RX ADMIN — Medication 600 MILLIGRAM(S): at 06:23

## 2025-05-24 RX ADMIN — Medication 975 MILLIGRAM(S): at 21:48

## 2025-05-24 RX ADMIN — Medication 50 MICROGRAM(S): at 05:46

## 2025-05-24 RX ADMIN — Medication 600 MILLIGRAM(S): at 05:46

## 2025-05-24 RX ADMIN — Medication 600 MILLIGRAM(S): at 18:47

## 2025-05-24 RX ADMIN — Medication 600 MILLIGRAM(S): at 12:31

## 2025-05-24 RX ADMIN — Medication 975 MILLIGRAM(S): at 02:14

## 2025-05-24 RX ADMIN — Medication 975 MILLIGRAM(S): at 08:29

## 2025-05-24 RX ADMIN — Medication 975 MILLIGRAM(S): at 03:00

## 2025-05-24 RX ADMIN — Medication 975 MILLIGRAM(S): at 15:50

## 2025-05-24 RX ADMIN — Medication 600 MILLIGRAM(S): at 00:00

## 2025-05-24 RX ADMIN — Medication 975 MILLIGRAM(S): at 09:15

## 2025-05-24 RX ADMIN — Medication 600 MILLIGRAM(S): at 12:50

## 2025-05-24 RX ADMIN — Medication 80 MILLIGRAM(S): at 21:48

## 2025-05-24 RX ADMIN — Medication 80 MILLIGRAM(S): at 08:30

## 2025-05-24 RX ADMIN — Medication 975 MILLIGRAM(S): at 22:48

## 2025-05-24 RX ADMIN — HEPARIN SODIUM 5000 UNIT(S): 1000 INJECTION INTRAVENOUS; SUBCUTANEOUS at 12:32

## 2025-05-25 VITALS
SYSTOLIC BLOOD PRESSURE: 111 MMHG | RESPIRATION RATE: 18 BRPM | OXYGEN SATURATION: 97 % | DIASTOLIC BLOOD PRESSURE: 73 MMHG | TEMPERATURE: 98 F | HEART RATE: 94 BPM

## 2025-05-25 PROCEDURE — 86850 RBC ANTIBODY SCREEN: CPT

## 2025-05-25 PROCEDURE — 86780 TREPONEMA PALLIDUM: CPT

## 2025-05-25 PROCEDURE — 85027 COMPLETE CBC AUTOMATED: CPT

## 2025-05-25 PROCEDURE — 85025 COMPLETE CBC W/AUTO DIFF WBC: CPT

## 2025-05-25 PROCEDURE — 59025 FETAL NON-STRESS TEST: CPT

## 2025-05-25 PROCEDURE — 86901 BLOOD TYPING SEROLOGIC RH(D): CPT

## 2025-05-25 PROCEDURE — 59050 FETAL MONITOR W/REPORT: CPT

## 2025-05-25 PROCEDURE — 86900 BLOOD TYPING SEROLOGIC ABO: CPT

## 2025-05-25 RX ADMIN — HEPARIN SODIUM 5000 UNIT(S): 1000 INJECTION INTRAVENOUS; SUBCUTANEOUS at 00:09

## 2025-05-25 RX ADMIN — Medication 975 MILLIGRAM(S): at 09:40

## 2025-05-25 RX ADMIN — Medication 975 MILLIGRAM(S): at 08:55

## 2025-05-25 RX ADMIN — Medication 975 MILLIGRAM(S): at 04:11

## 2025-05-25 RX ADMIN — Medication 975 MILLIGRAM(S): at 03:11

## 2025-05-25 RX ADMIN — Medication 50 MICROGRAM(S): at 05:58

## 2025-05-25 RX ADMIN — Medication 600 MILLIGRAM(S): at 12:45

## 2025-05-25 RX ADMIN — Medication 600 MILLIGRAM(S): at 06:35

## 2025-05-25 RX ADMIN — Medication 600 MILLIGRAM(S): at 11:58

## 2025-05-25 RX ADMIN — Medication 600 MILLIGRAM(S): at 01:09

## 2025-05-25 RX ADMIN — Medication 600 MILLIGRAM(S): at 00:09

## 2025-05-25 RX ADMIN — HEPARIN SODIUM 5000 UNIT(S): 1000 INJECTION INTRAVENOUS; SUBCUTANEOUS at 11:59

## 2025-05-25 RX ADMIN — Medication 600 MILLIGRAM(S): at 05:55

## 2025-05-25 NOTE — PROGRESS NOTE ADULT - PROBLEM SELECTOR PLAN 1
Cont. PP/PO Care
Increase OOB  Pain protocol  DVT ppx  Regular diet  Routine Postpartum/Post-op care    Natalie Bishop FNP-BC
Increase OOB  Pain protocol  DVT ppx  Regular diet  AM CBC  Routine Postpartum/Post-op care

## 2025-05-25 NOTE — PROGRESS NOTE ADULT - ASSESSMENT
A/P:  30y P3 POD # 1 S/P  primary  section 2/ to cornual ectopic  Doing well    PMHx:    PSHx: R ectopic pregnancy s/p LSC R salpingectomy 10/2023  6) R cornual ectopic pregnancy s/p LSC excision 1/3/2024  Current Issues: none   
30y P3 POD #2 S/P  primary  section 2/ to cornual ectopic; Doing well    PSHx: R ectopic pregnancy s/p LSC R salpingectomy 10/2023;  R cornual ectopic pregnancy s/p LSC excision 1/3/2024    Current Issues: none   
30 y.o. G7  P 3043      POD # 3  S/P Primary C/S for h/o cornual ectopic & salpingectomy,  in stable condition.

## 2025-07-18 ENCOUNTER — APPOINTMENT (OUTPATIENT)
Dept: ULTRASOUND IMAGING | Facility: CLINIC | Age: 30
End: 2025-07-18
Payer: COMMERCIAL

## 2025-07-18 ENCOUNTER — APPOINTMENT (OUTPATIENT)
Dept: MAMMOGRAPHY | Facility: CLINIC | Age: 30
End: 2025-07-18

## 2025-07-18 PROCEDURE — 76641 ULTRASOUND BREAST COMPLETE: CPT | Mod: 50
